# Patient Record
Sex: MALE | Race: WHITE | NOT HISPANIC OR LATINO
[De-identification: names, ages, dates, MRNs, and addresses within clinical notes are randomized per-mention and may not be internally consistent; named-entity substitution may affect disease eponyms.]

---

## 2017-02-28 ENCOUNTER — MEDICATION RENEWAL (OUTPATIENT)
Age: 62
End: 2017-02-28

## 2017-03-08 ENCOUNTER — MEDICATION RENEWAL (OUTPATIENT)
Age: 62
End: 2017-03-08

## 2017-04-18 ENCOUNTER — RX RENEWAL (OUTPATIENT)
Age: 62
End: 2017-04-18

## 2017-05-03 ENCOUNTER — LABORATORY RESULT (OUTPATIENT)
Age: 62
End: 2017-05-03

## 2017-05-11 ENCOUNTER — APPOINTMENT (OUTPATIENT)
Dept: NEPHROLOGY | Facility: CLINIC | Age: 62
End: 2017-05-11

## 2017-05-11 VITALS
HEART RATE: 76 BPM | DIASTOLIC BLOOD PRESSURE: 88 MMHG | HEIGHT: 70 IN | WEIGHT: 204 LBS | BODY MASS INDEX: 29.2 KG/M2 | SYSTOLIC BLOOD PRESSURE: 145 MMHG | OXYGEN SATURATION: 97 %

## 2017-05-11 VITALS — DIASTOLIC BLOOD PRESSURE: 82 MMHG | SYSTOLIC BLOOD PRESSURE: 144 MMHG

## 2017-08-30 ENCOUNTER — LABORATORY RESULT (OUTPATIENT)
Age: 62
End: 2017-08-30

## 2017-09-05 LAB
25(OH)D3 SERPL-MCNC: 31.4 NG/ML
ALBUMIN SERPL ELPH-MCNC: 4.5 G/DL
ALP BLD-CCNC: 79 U/L
ALT SERPL-CCNC: 18 U/L
ANION GAP SERPL CALC-SCNC: 15 MMOL/L
APPEARANCE: CLEAR
AST SERPL-CCNC: 18 U/L
BACTERIA: NEGATIVE
BASOPHILS # BLD AUTO: 0.01 K/UL
BASOPHILS NFR BLD AUTO: 0.2 %
BILIRUB SERPL-MCNC: 0.6 MG/DL
BILIRUBIN URINE: NEGATIVE
BLOOD URINE: NEGATIVE
BUN SERPL-MCNC: 20 MG/DL
CALCIUM SERPL-MCNC: 9.3 MG/DL
CHLORIDE SERPL-SCNC: 99 MMOL/L
CHOLEST SERPL-MCNC: 153 MG/DL
CHOLEST/HDLC SERPL: 4.4 RATIO
CO2 SERPL-SCNC: 25 MMOL/L
COLOR: YELLOW
CREAT SERPL-MCNC: 1.54 MG/DL
CREAT SPEC-SCNC: 25 MG/DL
CREAT SPEC-SCNC: 25 MG/DL
CREAT/PROT UR: NORMAL
EOSINOPHIL # BLD AUTO: 0.15 K/UL
EOSINOPHIL NFR BLD AUTO: 2.5 %
GLUCOSE QUALITATIVE U: NORMAL MG/DL
GLUCOSE SERPL-MCNC: 113 MG/DL
HBA1C MFR BLD HPLC: 5.8 %
HCT VFR BLD CALC: 47.1 %
HDLC SERPL-MCNC: 35 MG/DL
HGB BLD-MCNC: 15.6 G/DL
IGA 24H UR QL IFE: NORMAL
IMM GRANULOCYTES NFR BLD AUTO: 0.2 %
KETONES URINE: NEGATIVE
LDLC SERPL CALC-MCNC: 82 MG/DL
LEUKOCYTE ESTERASE URINE: NEGATIVE
LYMPHOCYTES # BLD AUTO: 1.37 K/UL
LYMPHOCYTES NFR BLD AUTO: 22.6 %
MAGNESIUM SERPL-MCNC: 1.9 MG/DL
MAN DIFF?: NORMAL
MCHC RBC-ENTMCNC: 27.9 PG
MCHC RBC-ENTMCNC: 33.1 GM/DL
MCV RBC AUTO: 84.1 FL
MICROALBUMIN 24H UR DL<=1MG/L-MCNC: <0.3 MG/DL
MICROALBUMIN/CREAT 24H UR-RTO: 54 MG/G
MICROSCOPIC-UA: NORMAL
MONOCYTES # BLD AUTO: 0.57 K/UL
MONOCYTES NFR BLD AUTO: 9.4 %
NEUTROPHILS # BLD AUTO: 3.96 K/UL
NEUTROPHILS NFR BLD AUTO: 65.1 %
NITRITE URINE: NEGATIVE
PH URINE: 6.5
PHOSPHATE SERPL-MCNC: 2.5 MG/DL
PLATELET # BLD AUTO: 197 K/UL
POTASSIUM SERPL-SCNC: 4.3 MMOL/L
PROT SERPL-MCNC: 7.3 G/DL
PROT UR-MCNC: <4 MG/DL
PROTEIN URINE: NEGATIVE MG/DL
RBC # BLD: 5.6 M/UL
RBC # FLD: 14 %
RED BLOOD CELLS URINE: 0 /HPF
SODIUM SERPL-SCNC: 139 MMOL/L
SPECIFIC GRAVITY URINE: 1.01
SQUAMOUS EPITHELIAL CELLS: 0 /HPF
TACROLIMUS SERPL-MCNC: 5.6 NG/ML
TRIGL SERPL-MCNC: 179 MG/DL
TSH SERPL-ACNC: 1.58 UIU/ML
URATE SERPL-MCNC: 5 MG/DL
UROBILINOGEN URINE: NORMAL MG/DL
WBC # FLD AUTO: 6.07 K/UL
WHITE BLOOD CELLS URINE: 2 /HPF

## 2017-09-06 ENCOUNTER — APPOINTMENT (OUTPATIENT)
Dept: NEPHROLOGY | Facility: CLINIC | Age: 62
End: 2017-09-06
Payer: MEDICARE

## 2017-09-06 VITALS
DIASTOLIC BLOOD PRESSURE: 77 MMHG | SYSTOLIC BLOOD PRESSURE: 121 MMHG | OXYGEN SATURATION: 98 % | WEIGHT: 203.92 LBS | HEIGHT: 70 IN | HEART RATE: 75 BPM | BODY MASS INDEX: 29.19 KG/M2

## 2017-09-06 DIAGNOSIS — R73.03 PREDIABETES.: ICD-10-CM

## 2017-09-06 PROCEDURE — 99214 OFFICE O/P EST MOD 30 MIN: CPT

## 2017-12-27 ENCOUNTER — MEDICATION RENEWAL (OUTPATIENT)
Age: 62
End: 2017-12-27

## 2018-01-04 LAB
25(OH)D3 SERPL-MCNC: 24.1 NG/ML
ALBUMIN SERPL ELPH-MCNC: 4.4 G/DL
ALP BLD-CCNC: 77 U/L
ALT SERPL-CCNC: 25 U/L
ANION GAP SERPL CALC-SCNC: 13 MMOL/L
APPEARANCE: CLEAR
AST SERPL-CCNC: 26 U/L
BACTERIA: NEGATIVE
BASOPHILS # BLD AUTO: 0.02 K/UL
BASOPHILS NFR BLD AUTO: 0.3 %
BILIRUB SERPL-MCNC: 0.7 MG/DL
BILIRUBIN URINE: NEGATIVE
BLOOD URINE: NEGATIVE
BUN SERPL-MCNC: 16 MG/DL
CALCIUM SERPL-MCNC: 9.5 MG/DL
CHLORIDE SERPL-SCNC: 100 MMOL/L
CHOLEST SERPL-MCNC: 152 MG/DL
CHOLEST/HDLC SERPL: 4.2 RATIO
CO2 SERPL-SCNC: 25 MMOL/L
COLOR: YELLOW
CREAT SERPL-MCNC: 1.48 MG/DL
CREAT SPEC-SCNC: 170 MG/DL
CREAT SPEC-SCNC: 170 MG/DL
CREAT/PROT UR: 0.1 RATIO
EOSINOPHIL # BLD AUTO: 0.29 K/UL
EOSINOPHIL NFR BLD AUTO: 4.1 %
GLUCOSE QUALITATIVE U: NEGATIVE MG/DL
GLUCOSE SERPL-MCNC: 111 MG/DL
HBA1C MFR BLD HPLC: 5.8 %
HCT VFR BLD CALC: 48 %
HDLC SERPL-MCNC: 36 MG/DL
HGB BLD-MCNC: 16.2 G/DL
HYALINE CASTS: 1 /LPF
IMM GRANULOCYTES NFR BLD AUTO: 0 %
KETONES URINE: NEGATIVE
LDLC SERPL CALC-MCNC: 86 MG/DL
LEUKOCYTE ESTERASE URINE: ABNORMAL
LYMPHOCYTES # BLD AUTO: 1.54 K/UL
LYMPHOCYTES NFR BLD AUTO: 21.7 %
MAGNESIUM SERPL-MCNC: 1.7 MG/DL
MAN DIFF?: NORMAL
MCHC RBC-ENTMCNC: 28.4 PG
MCHC RBC-ENTMCNC: 33.8 GM/DL
MCV RBC AUTO: 84.1 FL
MICROALBUMIN 24H UR DL<=1MG/L-MCNC: 1.2 MG/DL
MICROALBUMIN/CREAT 24H UR-RTO: 7 MG/G
MICROSCOPIC-UA: NORMAL
MONOCYTES # BLD AUTO: 0.57 K/UL
MONOCYTES NFR BLD AUTO: 8 %
NEUTROPHILS # BLD AUTO: 4.69 K/UL
NEUTROPHILS NFR BLD AUTO: 65.9 %
NITRITE URINE: NEGATIVE
PH URINE: 5
PHOSPHATE SERPL-MCNC: 2.4 MG/DL
PLATELET # BLD AUTO: 186 K/UL
POTASSIUM SERPL-SCNC: 4.4 MMOL/L
PROT SERPL-MCNC: 7.3 G/DL
PROT UR-MCNC: 11 MG/DL
PROTEIN URINE: NEGATIVE MG/DL
RBC # BLD: 5.71 M/UL
RBC # FLD: 14.2 %
RED BLOOD CELLS URINE: 2 /HPF
SODIUM SERPL-SCNC: 138 MMOL/L
SPECIFIC GRAVITY URINE: 1.02
SQUAMOUS EPITHELIAL CELLS: 1 /HPF
TACROLIMUS SERPL-MCNC: 7 NG/ML
TRIGL SERPL-MCNC: 148 MG/DL
URATE SERPL-MCNC: 5.5 MG/DL
UROBILINOGEN URINE: NEGATIVE MG/DL
WBC # FLD AUTO: 7.11 K/UL
WHITE BLOOD CELLS URINE: 8 /HPF

## 2018-01-10 ENCOUNTER — APPOINTMENT (OUTPATIENT)
Dept: NEPHROLOGY | Facility: CLINIC | Age: 63
End: 2018-01-10
Payer: MEDICARE

## 2018-01-10 VITALS
WEIGHT: 207.67 LBS | SYSTOLIC BLOOD PRESSURE: 130 MMHG | HEART RATE: 75 BPM | HEIGHT: 70 IN | DIASTOLIC BLOOD PRESSURE: 84 MMHG | OXYGEN SATURATION: 97 % | BODY MASS INDEX: 29.73 KG/M2

## 2018-01-10 VITALS — SYSTOLIC BLOOD PRESSURE: 132 MMHG | DIASTOLIC BLOOD PRESSURE: 80 MMHG

## 2018-01-10 PROCEDURE — 99214 OFFICE O/P EST MOD 30 MIN: CPT

## 2018-02-05 ENCOUNTER — MEDICATION RENEWAL (OUTPATIENT)
Age: 63
End: 2018-02-05

## 2018-03-26 LAB
25(OH)D3 SERPL-MCNC: 23.4 NG/ML
ALBUMIN SERPL ELPH-MCNC: 4 G/DL
ANION GAP SERPL CALC-SCNC: 14 MMOL/L
APPEARANCE: CLEAR
BACTERIA: NEGATIVE
BASOPHILS # BLD AUTO: 0.02 K/UL
BASOPHILS NFR BLD AUTO: 0.3 %
BILIRUBIN URINE: NEGATIVE
BLOOD URINE: NEGATIVE
BUN SERPL-MCNC: 23 MG/DL
CALCIUM SERPL-MCNC: 9.1 MG/DL
CHLORIDE SERPL-SCNC: 101 MMOL/L
CHOLEST SERPL-MCNC: 140 MG/DL
CHOLEST/HDLC SERPL: 5 RATIO
CO2 SERPL-SCNC: 23 MMOL/L
COLOR: YELLOW
CREAT SERPL-MCNC: 1.63 MG/DL
CREAT SPEC-SCNC: 88 MG/DL
CREAT SPEC-SCNC: 88 MG/DL
CREAT/PROT UR: 0.1 RATIO
EOSINOPHIL # BLD AUTO: 0.11 K/UL
EOSINOPHIL NFR BLD AUTO: 1.9 %
GLUCOSE QUALITATIVE U: NEGATIVE MG/DL
GLUCOSE SERPL-MCNC: 100 MG/DL
HBA1C MFR BLD HPLC: 5.9 %
HCT VFR BLD CALC: 45.2 %
HDLC SERPL-MCNC: 28 MG/DL
HGB BLD-MCNC: 15.4 G/DL
IMM GRANULOCYTES NFR BLD AUTO: 0.2 %
KETONES URINE: NEGATIVE
LDLC SERPL CALC-MCNC: 77 MG/DL
LEUKOCYTE ESTERASE URINE: NEGATIVE
LYMPHOCYTES # BLD AUTO: 1.3 K/UL
LYMPHOCYTES NFR BLD AUTO: 22.1 %
MAGNESIUM SERPL-MCNC: 1.8 MG/DL
MAN DIFF?: NORMAL
MCHC RBC-ENTMCNC: 28.3 PG
MCHC RBC-ENTMCNC: 34.1 GM/DL
MCV RBC AUTO: 82.9 FL
MICROALBUMIN 24H UR DL<=1MG/L-MCNC: 0.5 MG/DL
MICROALBUMIN/CREAT 24H UR-RTO: 6 MG/G
MICROSCOPIC-UA: NORMAL
MONOCYTES # BLD AUTO: 0.48 K/UL
MONOCYTES NFR BLD AUTO: 8.1 %
NEUTROPHILS # BLD AUTO: 3.97 K/UL
NEUTROPHILS NFR BLD AUTO: 67.4 %
NITRITE URINE: NEGATIVE
PH URINE: 6
PHOSPHATE SERPL-MCNC: 3 MG/DL
PLATELET # BLD AUTO: 178 K/UL
POTASSIUM SERPL-SCNC: 4.4 MMOL/L
PROT UR-MCNC: 6 MG/DL
PROTEIN URINE: NEGATIVE MG/DL
RBC # BLD: 5.45 M/UL
RBC # FLD: 14.2 %
RED BLOOD CELLS URINE: 1 /HPF
SODIUM SERPL-SCNC: 138 MMOL/L
SPECIFIC GRAVITY URINE: 1.01
SQUAMOUS EPITHELIAL CELLS: 0 /HPF
TACROLIMUS SERPL-MCNC: 7.4 NG/ML
TRIGL SERPL-MCNC: 175 MG/DL
TSH SERPL-ACNC: 1.93 UIU/ML
URATE SERPL-MCNC: 5.3 MG/DL
UROBILINOGEN URINE: NEGATIVE MG/DL
WBC # FLD AUTO: 5.89 K/UL
WHITE BLOOD CELLS URINE: 1 /HPF

## 2018-03-28 ENCOUNTER — APPOINTMENT (OUTPATIENT)
Dept: NEPHROLOGY | Facility: CLINIC | Age: 63
End: 2018-03-28
Payer: MEDICARE

## 2018-03-28 VITALS
WEIGHT: 204 LBS | DIASTOLIC BLOOD PRESSURE: 95 MMHG | HEIGHT: 70 IN | OXYGEN SATURATION: 98 % | SYSTOLIC BLOOD PRESSURE: 145 MMHG | HEART RATE: 73 BPM | BODY MASS INDEX: 29.2 KG/M2

## 2018-03-28 VITALS — DIASTOLIC BLOOD PRESSURE: 86 MMHG | SYSTOLIC BLOOD PRESSURE: 136 MMHG

## 2018-03-28 DIAGNOSIS — I12.9 HYPERTENSIVE CHRONIC KIDNEY DISEASE WITH STAGE 1 THROUGH STAGE 4 CHRONIC KIDNEY DISEASE, OR UNSPECIFIED CHRONIC KIDNEY DISEASE: ICD-10-CM

## 2018-03-28 DIAGNOSIS — Z92.29 PERSONAL HISTORY OF OTHER DRUG THERAPY: ICD-10-CM

## 2018-03-28 PROCEDURE — 99214 OFFICE O/P EST MOD 30 MIN: CPT

## 2018-04-04 ENCOUNTER — RX RENEWAL (OUTPATIENT)
Age: 63
End: 2018-04-04

## 2018-05-08 LAB
ALBUMIN SERPL ELPH-MCNC: 4.5 G/DL
ANION GAP SERPL CALC-SCNC: 14 MMOL/L
APPEARANCE: CLEAR
BACTERIA: NEGATIVE
BILIRUBIN URINE: NEGATIVE
BLOOD URINE: NEGATIVE
BUN SERPL-MCNC: 26 MG/DL
CALCIUM SERPL-MCNC: 9 MG/DL
CHLORIDE SERPL-SCNC: 100 MMOL/L
CO2 SERPL-SCNC: 24 MMOL/L
COLOR: YELLOW
CREAT SERPL-MCNC: 1.27 MG/DL
CREAT SPEC-SCNC: 31 MG/DL
CREAT/PROT UR: NORMAL
GLUCOSE QUALITATIVE U: NEGATIVE MG/DL
GLUCOSE SERPL-MCNC: 110 MG/DL
HYALINE CASTS: 0 /LPF
KETONES URINE: NEGATIVE
LEUKOCYTE ESTERASE URINE: NEGATIVE
MICROSCOPIC-UA: NORMAL
NITRITE URINE: NEGATIVE
PH URINE: 6
PHOSPHATE SERPL-MCNC: 2.4 MG/DL
POTASSIUM SERPL-SCNC: 3.9 MMOL/L
PROT UR-MCNC: <4 MG/DL
PROTEIN URINE: NEGATIVE MG/DL
RED BLOOD CELLS URINE: 0 /HPF
SODIUM SERPL-SCNC: 138 MMOL/L
SPECIFIC GRAVITY URINE: 1.01
SQUAMOUS EPITHELIAL CELLS: 0 /HPF
TACROLIMUS SERPL-MCNC: 5.7 NG/ML
UROBILINOGEN URINE: NEGATIVE MG/DL
WHITE BLOOD CELLS URINE: 3 /HPF

## 2018-05-16 ENCOUNTER — MEDICATION RENEWAL (OUTPATIENT)
Age: 63
End: 2018-05-16

## 2018-07-13 ENCOUNTER — APPOINTMENT (OUTPATIENT)
Dept: NEPHROLOGY | Facility: CLINIC | Age: 63
End: 2018-07-13
Payer: MEDICARE

## 2018-07-13 VITALS
BODY MASS INDEX: 28.72 KG/M2 | SYSTOLIC BLOOD PRESSURE: 125 MMHG | DIASTOLIC BLOOD PRESSURE: 92 MMHG | WEIGHT: 200.62 LBS | HEART RATE: 69 BPM | OXYGEN SATURATION: 98 % | HEIGHT: 70 IN

## 2018-07-13 PROCEDURE — 99213 OFFICE O/P EST LOW 20 MIN: CPT

## 2018-09-17 ENCOUNTER — RX RENEWAL (OUTPATIENT)
Age: 63
End: 2018-09-17

## 2018-09-17 LAB
25(OH)D3 SERPL-MCNC: 53.7 NG/ML
ALBUMIN SERPL ELPH-MCNC: 4.3 G/DL
ALP BLD-CCNC: 76 U/L
ALT SERPL-CCNC: 19 U/L
ANION GAP SERPL CALC-SCNC: 12 MMOL/L
APPEARANCE: CLEAR
AST SERPL-CCNC: 18 U/L
BASOPHILS # BLD AUTO: 0.02 K/UL
BASOPHILS NFR BLD AUTO: 0.3 %
BILIRUB SERPL-MCNC: 0.7 MG/DL
BILIRUBIN URINE: NEGATIVE
BKV DNA SPEC QL NAA+PROBE: NORMAL
BLOOD URINE: NEGATIVE
BUN SERPL-MCNC: 18 MG/DL
CALCIUM SERPL-MCNC: 9.3 MG/DL
CHLORIDE SERPL-SCNC: 99 MMOL/L
CHOLEST SERPL-MCNC: 135 MG/DL
CHOLEST/HDLC SERPL: 4 RATIO
CO2 SERPL-SCNC: 23 MMOL/L
COLOR: YELLOW
CREAT SERPL-MCNC: 1.29 MG/DL
CREAT SPEC-SCNC: 23 MG/DL
CREAT/PROT UR: NORMAL
EOSINOPHIL # BLD AUTO: 0.17 K/UL
EOSINOPHIL NFR BLD AUTO: 2.3 %
GLUCOSE QUALITATIVE U: NEGATIVE MG/DL
GLUCOSE SERPL-MCNC: 108 MG/DL
HCT VFR BLD CALC: 46.3 %
HDLC SERPL-MCNC: 34 MG/DL
HGB BLD-MCNC: 15.3 G/DL
IMM GRANULOCYTES NFR BLD AUTO: 0.3 %
KETONES URINE: NEGATIVE
LDLC SERPL CALC-MCNC: 79 MG/DL
LEUKOCYTE ESTERASE URINE: NEGATIVE
LYMPHOCYTES # BLD AUTO: 1.15 K/UL
LYMPHOCYTES NFR BLD AUTO: 15.4 %
MAN DIFF?: NORMAL
MCHC RBC-ENTMCNC: 27.6 PG
MCHC RBC-ENTMCNC: 33 GM/DL
MCV RBC AUTO: 83.4 FL
MONOCYTES # BLD AUTO: 0.68 K/UL
MONOCYTES NFR BLD AUTO: 9.1 %
NEUTROPHILS # BLD AUTO: 5.43 K/UL
NEUTROPHILS NFR BLD AUTO: 72.6 %
NITRITE URINE: NEGATIVE
PH URINE: 6.5
PLATELET # BLD AUTO: 200 K/UL
POTASSIUM SERPL-SCNC: 4 MMOL/L
PROT SERPL-MCNC: 7.2 G/DL
PROT UR-MCNC: <4 MG/DL
PROTEIN URINE: NEGATIVE MG/DL
RBC # BLD: 5.55 M/UL
RBC # FLD: 14 %
SODIUM SERPL-SCNC: 134 MMOL/L
SPECIFIC GRAVITY URINE: 1
TACROLIMUS SERPL-MCNC: 5.8 NG/ML
TRIGL SERPL-MCNC: 112 MG/DL
UROBILINOGEN URINE: NEGATIVE MG/DL
WBC # FLD AUTO: 7.47 K/UL

## 2018-09-20 ENCOUNTER — APPOINTMENT (OUTPATIENT)
Dept: NEPHROLOGY | Facility: CLINIC | Age: 63
End: 2018-09-20
Payer: MEDICARE

## 2018-09-20 VITALS
DIASTOLIC BLOOD PRESSURE: 103 MMHG | HEART RATE: 70 BPM | SYSTOLIC BLOOD PRESSURE: 157 MMHG | HEIGHT: 70 IN | BODY MASS INDEX: 28.63 KG/M2 | WEIGHT: 200 LBS | OXYGEN SATURATION: 97 %

## 2018-09-20 PROCEDURE — 99214 OFFICE O/P EST MOD 30 MIN: CPT

## 2019-01-22 ENCOUNTER — RX RENEWAL (OUTPATIENT)
Age: 64
End: 2019-01-22

## 2019-02-25 ENCOUNTER — MEDICATION RENEWAL (OUTPATIENT)
Age: 64
End: 2019-02-25

## 2019-02-26 ENCOUNTER — MEDICATION RENEWAL (OUTPATIENT)
Age: 64
End: 2019-02-26

## 2019-03-22 ENCOUNTER — APPOINTMENT (OUTPATIENT)
Dept: NEPHROLOGY | Facility: CLINIC | Age: 64
End: 2019-03-22
Payer: MEDICARE

## 2019-03-22 VITALS
BODY MASS INDEX: 29.35 KG/M2 | SYSTOLIC BLOOD PRESSURE: 146 MMHG | WEIGHT: 205.03 LBS | OXYGEN SATURATION: 97 % | HEART RATE: 69 BPM | HEIGHT: 70 IN | DIASTOLIC BLOOD PRESSURE: 89 MMHG

## 2019-03-22 VITALS — SYSTOLIC BLOOD PRESSURE: 132 MMHG | DIASTOLIC BLOOD PRESSURE: 84 MMHG

## 2019-03-22 LAB
25(OH)D3 SERPL-MCNC: 59.1 NG/ML
ALBUMIN SERPL ELPH-MCNC: 4.2 G/DL
ALP BLD-CCNC: 66 U/L
ALT SERPL-CCNC: 22 U/L
ANION GAP SERPL CALC-SCNC: 11 MMOL/L
APPEARANCE: CLEAR
AST SERPL-CCNC: 18 U/L
BASOPHILS # BLD AUTO: 0.03 K/UL
BASOPHILS NFR BLD AUTO: 0.5 %
BILIRUB SERPL-MCNC: 0.7 MG/DL
BILIRUBIN URINE: NEGATIVE
BLOOD URINE: NEGATIVE
BUN SERPL-MCNC: 18 MG/DL
CALCIUM SERPL-MCNC: 9.4 MG/DL
CHLORIDE SERPL-SCNC: 102 MMOL/L
CHOLEST SERPL-MCNC: 123 MG/DL
CHOLEST/HDLC SERPL: 3.8 RATIO
CO2 SERPL-SCNC: 25 MMOL/L
COLOR: COLORLESS
CREAT SERPL-MCNC: 1.33 MG/DL
CREAT SPEC-SCNC: 21 MG/DL
CREAT/PROT UR: NORMAL RATIO
EOSINOPHIL # BLD AUTO: 0.15 K/UL
EOSINOPHIL NFR BLD AUTO: 2.7 %
GLUCOSE QUALITATIVE U: NEGATIVE
GLUCOSE SERPL-MCNC: 101 MG/DL
HBA1C MFR BLD HPLC: 6 %
HCT VFR BLD CALC: 47.1 %
HDLC SERPL-MCNC: 32 MG/DL
HGB BLD-MCNC: 15.7 G/DL
IMM GRANULOCYTES NFR BLD AUTO: 0.2 %
KETONES URINE: NEGATIVE
LDLC SERPL CALC-MCNC: 72 MG/DL
LEUKOCYTE ESTERASE URINE: NEGATIVE
LYMPHOCYTES # BLD AUTO: 1.17 K/UL
LYMPHOCYTES NFR BLD AUTO: 21.2 %
MAGNESIUM SERPL-MCNC: 1.7 MG/DL
MAN DIFF?: NORMAL
MCHC RBC-ENTMCNC: 27.7 PG
MCHC RBC-ENTMCNC: 33.3 GM/DL
MCV RBC AUTO: 83.2 FL
MONOCYTES # BLD AUTO: 0.53 K/UL
MONOCYTES NFR BLD AUTO: 9.6 %
NEUTROPHILS # BLD AUTO: 3.63 K/UL
NEUTROPHILS NFR BLD AUTO: 65.8 %
NITRITE URINE: NEGATIVE
PH URINE: 6.5
PLATELET # BLD AUTO: 182 K/UL
POTASSIUM SERPL-SCNC: 4.2 MMOL/L
PROT SERPL-MCNC: 7 G/DL
PROT UR-MCNC: <4 MG/DL
PROTEIN URINE: NEGATIVE
RBC # BLD: 5.66 M/UL
RBC # FLD: 13.5 %
SODIUM SERPL-SCNC: 138 MMOL/L
SPECIFIC GRAVITY URINE: 1
TACROLIMUS SERPL-MCNC: 6.3 NG/ML
TRIGL SERPL-MCNC: 93 MG/DL
URATE SERPL-MCNC: 5.2 MG/DL
UROBILINOGEN URINE: NORMAL
WBC # FLD AUTO: 5.52 K/UL

## 2019-03-22 PROCEDURE — 99214 OFFICE O/P EST MOD 30 MIN: CPT

## 2019-03-22 NOTE — HISTORY OF PRESENT ILLNESS
[FreeTextEntry1] : Today I had the pleasure of seeing Eldon Bull who is a 63 years old man who had a living related renal transplant in october 2014 from him brother.  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  By the patient's report the course at [facility] was uncomplicated.  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  He eats an unbalanced diet with salt and sugar but he stay active and walks many miles per day.  On evaluation today he feels well, denies dyspnea, thrush, chest pain, cough, NVD.  Recently however he went to an urgent care and was treated for bronchitis with a course of augmentin.  \par \par 9/20/18 -- I saw Eldon today and he reports feeling well.  No issues with urination.  He has been staying well hydrated in the summer heat.  His BP has been elevated recently and he is eating a lot but also walking a lot.  Still working on a low salt diet.\par \par 3/22/19 --  I saw doron today and he feels really good.  Hasn't been exercising as much in the winter time.  walks still but less.  no cp no sob no nvd.

## 2019-03-22 NOTE — REVIEW OF SYSTEMS
[Fever] : no fever [Chills] : no chills [Feeling Poorly] : not feeling poorly [Feeling Tired] : not feeling tired [Eyesight Problems] : no eyesight problems [Discharge From Eyes] : no purulent discharge from the eyes [Nosebleeds] : no nosebleeds [Nasal Discharge] : no nasal discharge [Chest Pain] : no chest pain [Palpitations] : no palpitations [Leg Claudication] : no intermittent leg claudication [Lower Ext Edema] : no extremity edema [Shortness Of Breath] : no shortness of breath [Wheezing] : no wheezing [Cough] : no cough [SOB on Exertion] : no shortness of breath during exertion [Abdominal Pain] : no abdominal pain [Vomiting] : no vomiting [Constipation] : no constipation [Diarrhea] : no diarrhea [Hesitancy] : no urinary hesitancy [Nocturia] : no nocturia [Arthralgias] : no arthralgias [Joint Pain] : no joint pain [Confused] : no confusion [Convulsions] : no convulsions [Fainting] : no fainting [Anxiety] : no anxiety [Depression] : no depression [Easy Bleeding] : no tendency for easy bleeding [Easy Bruising] : no tendency for easy bruising

## 2019-03-22 NOTE — PHYSICAL EXAM
[General Appearance - Alert] : alert [General Appearance - In No Acute Distress] : in no acute distress [General Appearance - Well Nourished] : well nourished [General Appearance - Well Developed] : well developed [Sclera] : the sclera and conjunctiva were normal [FreeTextEntry1] : no thrush noted [Neck Appearance] : the appearance of the neck was normal [Neck Cervical Mass (___cm)] : no neck mass was observed [Jugular Venous Distention Increased] : there was no jugular-venous distention [Thyroid Diffuse Enlargement] : the thyroid was not enlarged [Respiration, Rhythm And Depth] : normal respiratory rhythm and effort [Exaggerated Use Of Accessory Muscles For Inspiration] : no accessory muscle use [Auscultation Breath Sounds / Voice Sounds] : lungs were clear to auscultation bilaterally [Heart Sounds] : normal S1 and S2 [Heart Sounds Gallop] : no gallops [Murmurs] : no murmurs [Heart Sounds Pericardial Friction Rub] : no pericardial rub [Edema] : there was no peripheral edema [Abdomen Soft] : soft [Abdomen Tenderness] : non-tender [] : no hepato-splenomegaly [Abdomen Mass (___ Cm)] : no abdominal mass palpated [Cervical Lymph Nodes Enlarged Posterior Bilaterally] : posterior cervical [Cervical Lymph Nodes Enlarged Anterior Bilaterally] : anterior cervical [Supraclavicular Lymph Nodes Enlarged Bilaterally] : supraclavicular [Axillary Lymph Nodes Enlarged Bilaterally] : axillary [No CVA Tenderness] : no ~M costovertebral angle tenderness [No Spinal Tenderness] : no spinal tenderness [Abnormal Walk] : normal gait [Oriented To Time, Place, And Person] : oriented to person, place, and time [Impaired Insight] : insight and judgment were intact [Affect] : the affect was normal [Mood] : the mood was normal

## 2019-03-22 NOTE — ASSESSMENT
[FreeTextEntry1] : 64 years old man with PCKD s/p LRRT in 2014 doing well\par \par Kidney Replaced by Transplant -- The patient's creatinine has been  stable last reading at 1.3.  He gets blood work every few months and will come for a tacro level and repeat blood work ain six months and follow up with a visit with me thereafter.  I stressed to the patient the importance of notifying me any time new medications are prescribed so that we can check them for interactions.  We reviewed the importance of annual dermatology examinations as well as avoiding sick contacts, frequent hand washing, and he is up to date with vaccinations.\par \par Hypertension -- In the setting of  renal transplant on tacrolimus.  The patient's blood pressure is optimized.   Continue with present meds.  \par \par Health Care Maintenance -- Given his long term immunosuppressed status I stressed with him the importance of following up with dermatology annually.  I told him that he should get a flu shot and establish care with an internist.  number provided.

## 2019-09-11 LAB
ALBUMIN SERPL ELPH-MCNC: 4.6 G/DL
ALP BLD-CCNC: 77 U/L
ALT SERPL-CCNC: 21 U/L
ANION GAP SERPL CALC-SCNC: 13 MMOL/L
APPEARANCE: CLEAR
AST SERPL-CCNC: 19 U/L
BASOPHILS # BLD AUTO: 0.02 K/UL
BASOPHILS NFR BLD AUTO: 0.3 %
BILIRUB SERPL-MCNC: 0.5 MG/DL
BILIRUBIN URINE: NEGATIVE
BLOOD URINE: NEGATIVE
BUN SERPL-MCNC: 17 MG/DL
CALCIUM SERPL-MCNC: 9.3 MG/DL
CHLORIDE SERPL-SCNC: 102 MMOL/L
CHOLEST SERPL-MCNC: 132 MG/DL
CHOLEST/HDLC SERPL: 3.9 RATIO
CO2 SERPL-SCNC: 22 MMOL/L
COLOR: COLORLESS
CREAT SERPL-MCNC: 1.27 MG/DL
CREAT SPEC-SCNC: 10 MG/DL
CREAT/PROT UR: NORMAL RATIO
EOSINOPHIL # BLD AUTO: 0.2 K/UL
EOSINOPHIL NFR BLD AUTO: 3.1 %
ESTIMATED AVERAGE GLUCOSE: 128 MG/DL
GLUCOSE QUALITATIVE U: NEGATIVE
GLUCOSE SERPL-MCNC: 115 MG/DL
HBA1C MFR BLD HPLC: 6.1 %
HCT VFR BLD CALC: 48.3 %
HDLC SERPL-MCNC: 34 MG/DL
HGB BLD-MCNC: 15.9 G/DL
IMM GRANULOCYTES NFR BLD AUTO: 0.2 %
KETONES URINE: NEGATIVE
LDLC SERPL CALC-MCNC: 71 MG/DL
LEUKOCYTE ESTERASE URINE: NEGATIVE
LYMPHOCYTES # BLD AUTO: 1.3 K/UL
LYMPHOCYTES NFR BLD AUTO: 20.4 %
MAGNESIUM SERPL-MCNC: 1.8 MG/DL
MAN DIFF?: NORMAL
MCHC RBC-ENTMCNC: 28 PG
MCHC RBC-ENTMCNC: 32.9 GM/DL
MCV RBC AUTO: 85 FL
MONOCYTES # BLD AUTO: 0.63 K/UL
MONOCYTES NFR BLD AUTO: 9.9 %
NEUTROPHILS # BLD AUTO: 4.2 K/UL
NEUTROPHILS NFR BLD AUTO: 66.1 %
NITRITE URINE: NEGATIVE
PH URINE: 6.5
PLATELET # BLD AUTO: 151 K/UL
POTASSIUM SERPL-SCNC: 4 MMOL/L
PROT SERPL-MCNC: 7.2 G/DL
PROT UR-MCNC: <4 MG/DL
PROTEIN URINE: NEGATIVE
RBC # BLD: 5.68 M/UL
RBC # FLD: 13.9 %
SODIUM SERPL-SCNC: 137 MMOL/L
SPECIFIC GRAVITY URINE: 1
TACROLIMUS SERPL-MCNC: 6.4 NG/ML
TRIGL SERPL-MCNC: 133 MG/DL
URATE SERPL-MCNC: 5.3 MG/DL
UROBILINOGEN URINE: NORMAL
WBC # FLD AUTO: 6.36 K/UL

## 2019-09-12 LAB — 25(OH)D3 SERPL-MCNC: 45.8 NG/ML

## 2019-09-20 ENCOUNTER — APPOINTMENT (OUTPATIENT)
Dept: NEPHROLOGY | Facility: CLINIC | Age: 64
End: 2019-09-20
Payer: MEDICARE

## 2019-09-20 VITALS
HEART RATE: 68 BPM | OXYGEN SATURATION: 97 % | SYSTOLIC BLOOD PRESSURE: 140 MMHG | HEIGHT: 70 IN | WEIGHT: 194 LBS | DIASTOLIC BLOOD PRESSURE: 82 MMHG | BODY MASS INDEX: 27.77 KG/M2

## 2019-09-20 PROCEDURE — 99214 OFFICE O/P EST MOD 30 MIN: CPT

## 2019-09-20 NOTE — HISTORY OF PRESENT ILLNESS
[FreeTextEntry1] : Today I had the pleasure of seeing Eldon Bull who is a 63 years old man who had a living related renal transplant in october 2014 from him brother.  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  By the patient's report the course at [facility] was uncomplicated.  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  He eats an unbalanced diet with salt and sugar but he stay active and walks many miles per day.  On evaluation today he feels well, denies dyspnea, thrush, chest pain, cough, NVD.  Recently however he went to an urgent care and was treated for bronchitis with a course of augmentin.  \par \par 9/20/18 -- I saw Eldon today and he reports feeling well.  No issues with urination.  He has been staying well hydrated in the summer heat.  His BP has been elevated recently and he is eating a lot but also walking a lot.  Still working on a low salt diet.\par \par 3/22/19 --  I saw doron today and he feels really good.  Hasn't been exercising as much in the winter time.  walks still but less.  no cp no sob no nvd.\par \par 9/20/19 -- I saw doron today and he feels well overall.  he has not been watching his salt and he forgot to take his bp meds today.  he has been having a mild dull ache on his left flank at the surface of his skin associated with a number of lumps.  no cardiac symptoms.

## 2019-09-20 NOTE — PHYSICAL EXAM
[General Appearance - Well Nourished] : well nourished [General Appearance - In No Acute Distress] : in no acute distress [General Appearance - Alert] : alert [General Appearance - Well Developed] : well developed [Sclera] : the sclera and conjunctiva were normal [Neck Appearance] : the appearance of the neck was normal [Neck Cervical Mass (___cm)] : no neck mass was observed [Jugular Venous Distention Increased] : there was no jugular-venous distention [Thyroid Diffuse Enlargement] : the thyroid was not enlarged [Respiration, Rhythm And Depth] : normal respiratory rhythm and effort [Exaggerated Use Of Accessory Muscles For Inspiration] : no accessory muscle use [Auscultation Breath Sounds / Voice Sounds] : lungs were clear to auscultation bilaterally [Heart Sounds] : normal S1 and S2 [Heart Sounds Gallop] : no gallops [Murmurs] : no murmurs [Heart Sounds Pericardial Friction Rub] : no pericardial rub [Abdomen Soft] : soft [Abdomen Tenderness] : non-tender [] : no hepato-splenomegaly [Abdomen Mass (___ Cm)] : no abdominal mass palpated [Cervical Lymph Nodes Enlarged Posterior Bilaterally] : posterior cervical [Cervical Lymph Nodes Enlarged Anterior Bilaterally] : anterior cervical [Supraclavicular Lymph Nodes Enlarged Bilaterally] : supraclavicular [Axillary Lymph Nodes Enlarged Bilaterally] : axillary [No CVA Tenderness] : no ~M costovertebral angle tenderness [No Spinal Tenderness] : no spinal tenderness [Abnormal Walk] : normal gait [FreeTextEntry1] : on his left back and flank there are a number of subcutaneous rubbery mobile nodules likely representing lipoma [Oriented To Time, Place, And Person] : oriented to person, place, and time [Impaired Insight] : insight and judgment were intact [Affect] : the affect was normal [Mood] : the mood was normal

## 2019-09-20 NOTE — ASSESSMENT
[FreeTextEntry1] : 64 years old man with PCKD s/p LRRT in 2014 doing well\par \par Kidney Replaced by Transplant -- The patient's creatinine has been  stable.  He gets blood work every few months and will come for a tacro level and repeat blood work ain six months and follow up with a visit with me thereafter.  I stressed to the patient the importance of notifying me any time new medications are prescribed so that we can check them for interactions.  We reviewed the importance of annual dermatology examinations as well as avoiding sick contacts, frequent hand washing, and he is up to date with vaccinations. \par \par PKD -- The patient's left flank pain does not seem to be related to his native kidney but this could represent a ruptured cyst or even a stone.  I did not identify any obvious pathology on my POCUS screening exam.  Ordered formal renal sonogram.  I think the pain is coming from some lipomas under his skin.  he will see dermatology.\par \par Hypertension -- In the setting of  renal transplant on tacrolimus.  His diet is not low in salt.  stressed compliance with diet and medications.  a little edema noted today.\par \par Health Care Maintenance -- Given his long term immunosuppressed status I stressed with him the importance of following up with dermatology annually.  I told him that he should get a flu shot and establish care with an internist.  number provided.

## 2019-09-20 NOTE — REVIEW OF SYSTEMS
[Fever] : no fever [Chills] : no chills [Feeling Poorly] : not feeling poorly [Feeling Tired] : not feeling tired [Eyesight Problems] : no eyesight problems [Nosebleeds] : no nosebleeds [Chest Pain] : no chest pain [Palpitations] : no palpitations [Leg Claudication] : no intermittent leg claudication [Lower Ext Edema] : no extremity edema [Shortness Of Breath] : no shortness of breath [Wheezing] : no wheezing [SOB on Exertion] : no shortness of breath during exertion [Cough] : no cough [Abdominal Pain] : no abdominal pain [Vomiting] : no vomiting [As Noted in HPI] : as noted in HPI [Dizziness] : no dizziness [Arthralgias] : no arthralgias [Fainting] : no fainting [Anxiety] : no anxiety [Depression] : no depression [Easy Bleeding] : no tendency for easy bleeding

## 2019-10-01 ENCOUNTER — OUTPATIENT (OUTPATIENT)
Dept: OUTPATIENT SERVICES | Facility: HOSPITAL | Age: 64
LOS: 1 days | End: 2019-10-01
Payer: MEDICARE

## 2019-10-01 ENCOUNTER — APPOINTMENT (OUTPATIENT)
Dept: ULTRASOUND IMAGING | Facility: IMAGING CENTER | Age: 64
End: 2019-10-01
Payer: MEDICARE

## 2019-10-01 DIAGNOSIS — N18.4 CHRONIC KIDNEY DISEASE, STAGE 4 (SEVERE): ICD-10-CM

## 2019-10-01 PROCEDURE — 76770 US EXAM ABDO BACK WALL COMP: CPT | Mod: 26

## 2019-10-01 PROCEDURE — 76770 US EXAM ABDO BACK WALL COMP: CPT

## 2019-11-04 ENCOUNTER — APPOINTMENT (OUTPATIENT)
Dept: ULTRASOUND IMAGING | Facility: IMAGING CENTER | Age: 64
End: 2019-11-04
Payer: MEDICARE

## 2019-11-04 ENCOUNTER — OUTPATIENT (OUTPATIENT)
Dept: OUTPATIENT SERVICES | Facility: HOSPITAL | Age: 64
LOS: 1 days | End: 2019-11-04
Payer: MEDICARE

## 2019-11-04 DIAGNOSIS — D17.1 BENIGN LIPOMATOUS NEOPLASM OF SKIN AND SUBCUTANEOUS TISSUE OF TRUNK: ICD-10-CM

## 2019-11-04 PROCEDURE — 76882 US LMTD JT/FCL EVL NVASC XTR: CPT

## 2019-11-04 PROCEDURE — 76882 US LMTD JT/FCL EVL NVASC XTR: CPT | Mod: 26,RT

## 2019-11-19 ENCOUNTER — APPOINTMENT (OUTPATIENT)
Dept: SURGICAL ONCOLOGY | Facility: CLINIC | Age: 64
End: 2019-11-19
Payer: MEDICARE

## 2019-11-19 VITALS
HEIGHT: 70 IN | SYSTOLIC BLOOD PRESSURE: 143 MMHG | OXYGEN SATURATION: 96 % | HEART RATE: 70 BPM | DIASTOLIC BLOOD PRESSURE: 87 MMHG | WEIGHT: 190 LBS | BODY MASS INDEX: 27.2 KG/M2

## 2019-11-19 PROCEDURE — 99204 OFFICE O/P NEW MOD 45 MIN: CPT

## 2019-11-19 NOTE — CONSULT LETTER
[Dear  ___] : Dear  [unfilled], [Consult Letter:] : I had the pleasure of evaluating your patient, [unfilled]. [Please see my note below.] : Please see my note below. [Consult Closing:] : Thank you very much for allowing me to participate in the care of this patient.  If you have any questions, please do not hesitate to contact me. [Sincerely,] : Sincerely, [FreeTextEntry3] : Myke Mora MD, MPH, FACS\par Surgical Oncology\par [facility] [place]\par Associate Professor of Surgery\par Department of General Surgery\par [employer] [facility]  [DrLucio  ___] : Dr. DANIELLE

## 2019-11-19 NOTE — ASSESSMENT
[FreeTextEntry1] : 64 year old man with more prominent left flank soft tissue masses x 2, likely lipomas\par \par Plan:\par 1) Resection of left flank soft tissue masses x 2.  We discussed the risks, benefits, and alternatives of the procedure with the patient, he expressed understanding and agree to proceed.\par 2) Medical clearance\par 3) Continue immunosuppressive medications

## 2019-11-19 NOTE — HISTORY OF PRESENT ILLNESS
[de-identified] : 64 year old man s/p renal transplant in 10/2014 from brother\par He is currently on immunosuppressive medication (Allopurinol, Mycophenolate Mofetil, Tacrolimus)\par He has several soft tissue masses on his torso, which have been stable in size for years.\par Lately he has noticed that two of the masses on his left flank are more prominent and causing him signficant discomfort with occasional sharp pain.\par Denies fevers or chills.\par No weight loss.

## 2019-11-19 NOTE — REASON FOR VISIT
[Initial Consultation] : an initial consultation for [FreeTextEntry2] : left flank soft tissue masses

## 2019-11-19 NOTE — PHYSICAL EXAM
[Normal] : supple, no neck mass and thyroid not enlarged [Normal Neck Lymph Nodes] : normal neck lymph nodes  [Normal Supraclavicular Lymph Nodes] : normal supraclavicular lymph nodes [Normal Groin Lymph Nodes] : normal groin lymph nodes [Normal Axillary Lymph Nodes] : normal axillary lymph nodes [Normal] : oriented to person, place and time, with appropriate affect [de-identified] : two prominent soft tissue masses along left flank / lower ribs - mobile, non-tender, soft ; there are several other masses along left abdomen and chest, which do not cause discomfort

## 2019-12-12 ENCOUNTER — NON-APPOINTMENT (OUTPATIENT)
Age: 64
End: 2019-12-12

## 2019-12-12 ENCOUNTER — APPOINTMENT (OUTPATIENT)
Dept: INTERNAL MEDICINE | Facility: CLINIC | Age: 64
End: 2019-12-12
Payer: MEDICARE

## 2019-12-12 VITALS — SYSTOLIC BLOOD PRESSURE: 145 MMHG | DIASTOLIC BLOOD PRESSURE: 85 MMHG

## 2019-12-12 VITALS
SYSTOLIC BLOOD PRESSURE: 140 MMHG | HEIGHT: 70 IN | HEART RATE: 70 BPM | DIASTOLIC BLOOD PRESSURE: 90 MMHG | WEIGHT: 192 LBS | TEMPERATURE: 97.7 F | BODY MASS INDEX: 27.49 KG/M2 | OXYGEN SATURATION: 98 %

## 2019-12-12 DIAGNOSIS — Z00.00 ENCOUNTER FOR GENERAL ADULT MEDICAL EXAMINATION W/OUT ABNORMAL FINDINGS: ICD-10-CM

## 2019-12-12 DIAGNOSIS — Z01.818 ENCOUNTER FOR OTHER PREPROCEDURAL EXAMINATION: ICD-10-CM

## 2019-12-12 DIAGNOSIS — D17.9 BENIGN LIPOMATOUS NEOPLASM, UNSPECIFIED: ICD-10-CM

## 2019-12-12 DIAGNOSIS — Z94.0 KIDNEY TRANSPLANT STATUS: ICD-10-CM

## 2019-12-12 DIAGNOSIS — Z13.31 ENCOUNTER FOR SCREENING FOR DEPRESSION: ICD-10-CM

## 2019-12-12 PROCEDURE — G0439: CPT

## 2019-12-12 PROCEDURE — 99214 OFFICE O/P EST MOD 30 MIN: CPT | Mod: 25

## 2019-12-12 PROCEDURE — G0444 DEPRESSION SCREEN ANNUAL: CPT | Mod: 59

## 2019-12-12 PROCEDURE — 93000 ELECTROCARDIOGRAM COMPLETE: CPT

## 2019-12-12 PROCEDURE — 36415 COLL VENOUS BLD VENIPUNCTURE: CPT

## 2019-12-13 LAB
ALBUMIN SERPL ELPH-MCNC: 4.4 G/DL
ALP BLD-CCNC: 97 U/L
ALT SERPL-CCNC: 19 U/L
ANION GAP SERPL CALC-SCNC: 11 MMOL/L
AST SERPL-CCNC: 18 U/L
BASOPHILS # BLD AUTO: 0.03 K/UL
BASOPHILS NFR BLD AUTO: 0.4 %
BILIRUB SERPL-MCNC: 0.6 MG/DL
BUN SERPL-MCNC: 16 MG/DL
CALCIUM SERPL-MCNC: 9.3 MG/DL
CHLORIDE SERPL-SCNC: 107 MMOL/L
CO2 SERPL-SCNC: 22 MMOL/L
CREAT SERPL-MCNC: 1.25 MG/DL
EOSINOPHIL # BLD AUTO: 0.14 K/UL
EOSINOPHIL NFR BLD AUTO: 2 %
GLUCOSE SERPL-MCNC: 116 MG/DL
HCT VFR BLD CALC: 45.6 %
HGB BLD-MCNC: 14.9 G/DL
IMM GRANULOCYTES NFR BLD AUTO: 0.3 %
INR PPP: 1.03 RATIO
LYMPHOCYTES # BLD AUTO: 0.76 K/UL
LYMPHOCYTES NFR BLD AUTO: 11.1 %
MAN DIFF?: NORMAL
MCHC RBC-ENTMCNC: 28.1 PG
MCHC RBC-ENTMCNC: 32.7 GM/DL
MCV RBC AUTO: 85.9 FL
MONOCYTES # BLD AUTO: 0.51 K/UL
MONOCYTES NFR BLD AUTO: 7.5 %
NEUTROPHILS # BLD AUTO: 5.38 K/UL
NEUTROPHILS NFR BLD AUTO: 78.7 %
PLATELET # BLD AUTO: 138 K/UL
POTASSIUM SERPL-SCNC: 4.5 MMOL/L
PROT SERPL-MCNC: 6.8 G/DL
PT BLD: 11.7 SEC
RBC # BLD: 5.31 M/UL
RBC # FLD: 14.2 %
SODIUM SERPL-SCNC: 140 MMOL/L
WBC # FLD AUTO: 6.84 K/UL

## 2019-12-13 NOTE — PLAN
[FreeTextEntry1] : Preoperative exam prior to lipoma excision on 12/20 with Dr Mora\par -CBC, CMP, PT/INR ordered\par -EKG reviewed\par -medically optimized pending review of labs \par -PST to be scheduled\par \par S/p renal transplant\par -immunosuppressive medications as per renal\par -routine follow up with Dr Navas\par \par HTN\par -borderline on nifedipine\par -encourage diet control, home BP monitoring

## 2019-12-13 NOTE — HEALTH RISK ASSESSMENT
[Yes] : Yes [Never (0 pts)] : Never (0 points) [Monthly or less (1 pt)] : Monthly or less (1 point) [1 or 2 (0 pts)] : 1 or 2 (0 points) [0] : 1) Little interest or pleasure doing things: Not at all (0) [No] : In the past 12 months have you used drugs other than those required for medical reasons? No [Patient reported colonoscopy was normal] : Patient reported colonoscopy was normal [With Family] : lives with family [None] : None [Retired] : retired [] :  [Feels Safe at Home] : Feels safe at home [Fully functional (bathing, dressing, toileting, transferring, walking, feeding)] : Fully functional (bathing, dressing, toileting, transferring, walking, feeding) [# Of Children ___] : has [unfilled] children [Fully functional (using the telephone, shopping, preparing meals, housekeeping, doing laundry, using] : Fully functional and needs no help or supervision to perform IADLs (using the telephone, shopping, preparing meals, housekeeping, doing laundry, using transportation, managing medications and managing finances) [Smoke Detector] : smoke detector [Seat Belt] :  uses seat belt [Carbon Monoxide Detector] : carbon monoxide detector [] : No [Audit-CScore] : 1 [[id number]] : 0 [Reports changes in hearing] : Reports no changes in hearing [Change in mental status noted] : No change in mental status noted [Language] : denies difficulty with language [Reports changes in vision] : Reports no changes in vision [Reports changes in dental health] : Reports no changes in dental health [ColonoscopyDate] : 5 yrs ago [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

## 2019-12-13 NOTE — HISTORY OF PRESENT ILLNESS
[FreeTextEntry1] : establish care, medical clearance [de-identified] : 63yo M with PMH of ADPKD s/p renal transplant in 2014 on immunosuppressive medication and HTN presents to establish care, medical clearance prior to soft tissue mass/lipoma removal from left flank scheduled for 12/20 with Dr Mora. He has had multiple lipomas for many years but over the last 6 months they have been particularly causing discomfort in the left flank and side area. He denies any other acute complaints; no fever, chills, HA, dizziness, CP, SOB, abd pain, N/V/D, urinary concerns. His procedure was originally scheduled for 1/8 but it has been moved up to 12/20; PST is not yet scheduled but he will call them today to arrange this.

## 2019-12-16 ENCOUNTER — OUTPATIENT (OUTPATIENT)
Dept: OUTPATIENT SERVICES | Facility: HOSPITAL | Age: 64
LOS: 1 days | End: 2019-12-16

## 2019-12-16 VITALS
RESPIRATION RATE: 16 BRPM | OXYGEN SATURATION: 98 % | DIASTOLIC BLOOD PRESSURE: 90 MMHG | HEART RATE: 67 BPM | SYSTOLIC BLOOD PRESSURE: 140 MMHG | WEIGHT: 190.92 LBS | TEMPERATURE: 98 F | HEIGHT: 70 IN

## 2019-12-16 DIAGNOSIS — Z94.0 KIDNEY TRANSPLANT STATUS: Chronic | ICD-10-CM

## 2019-12-16 DIAGNOSIS — Z98.890 OTHER SPECIFIED POSTPROCEDURAL STATES: Chronic | ICD-10-CM

## 2019-12-16 DIAGNOSIS — D17.1 BENIGN LIPOMATOUS NEOPLASM OF SKIN AND SUBCUTANEOUS TISSUE OF TRUNK: ICD-10-CM

## 2019-12-16 NOTE — H&P PST ADULT - HISTORY OF PRESENT ILLNESS
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 Pt with soft tissue masses for a couple of years on torso, complaining of discomfort x 6 months. 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 Pt with soft tissue masses for a couple of years to torso, complaining of discomfort x 6 months.

## 2019-12-16 NOTE — H&P PST ADULT - NSICDXPASTMEDICALHX_GEN_ALL_CORE_FT
PAST MEDICAL HISTORY:  CKD (chronic kidney disease)     Gout     H/O polycystic kidney disease, autosomal dominant     H/O secondary hyperparathyroidism     Hypertension     Lipoma

## 2019-12-16 NOTE — H&P PST ADULT - ASSESSMENT
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 Pre-op instructions provided. Pt given verbal and written instructions with teach back on chlorhexidine shampoo. Pt verbalized understanding with return demonstration.     Medical evaluation with ekg and labs in chart.    Problem: HTN  Assessment and Plan: Pt instructed to take nifedipine on the morning of procedure.    Problem: Kidney transplant  Assessment and Plan: Pt instructed to take tacrolimus and mycophenolate on the morning of procedure.

## 2019-12-16 NOTE — H&P PST ADULT - NEGATIVE NEUROLOGICAL SYMPTOMS
Interval History: No major events overnight.    Review of Systems   Unable to perform ROS: Intubated     Objective:     Vital Signs (Most Recent):  Temp: 99.6 °F (37.6 °C) (06/24/17 1115)  Pulse: 81 (06/24/17 1215)  Resp: (!) 26 (06/24/17 1215)  BP: (!) 166/77 (06/24/17 1200)  SpO2: 96 % (06/24/17 1215) Vital Signs (24h Range):  Temp:  [98 °F (36.7 °C)-99.6 °F (37.6 °C)] 99.6 °F (37.6 °C)  Pulse:  [70-98] 81  Resp:  [0-40] 26  SpO2:  [92 %-98 %] 96 %  BP: (114-186)/(56-85) 166/77     Weight: (!) 158 kg (348 lb 5.2 oz)  Body mass index is 57.96 kg/m².    Intake/Output Summary (Last 24 hours) at 06/24/17 1234  Last data filed at 06/24/17 1200   Gross per 24 hour   Intake          2527.34 ml   Output             1975 ml   Net           552.34 ml      Physical Exam   Constitutional: No distress.   Morbidly obese   HENT:   ET tube in place  Increased edema on facial area.   Eyes: Conjunctivae are normal. Right eye exhibits no discharge. Left eye exhibits no discharge.   Neck: Neck supple.   Cardiovascular: Normal rate, regular rhythm and normal heart sounds.    Pulmonary/Chest: Breath sounds normal. No stridor.   Mechanically ventilated   Abdominal: Soft. Bowel sounds are normal.   Musculoskeletal: She exhibits edema. She exhibits no deformity.   Neurological:   Sedated   Skin: Skin is warm and dry.       Significant Labs:   BMP:     Recent Labs  Lab 06/24/17  0150   *   *   K 3.9      CO2 24   BUN 48*   CREATININE 1.3   CALCIUM 8.2*     CBC:     Recent Labs  Lab 06/23/17  0330   WBC 12.14   HGB 9.4*   HCT 29.3*          Significant Imaging: I have reviewed all pertinent imaging results/findings within the past 24 hours.   no transient paralysis/no generalized seizures/no tremors/no weakness/no paresthesias/no syncope/no difficulty walking

## 2019-12-16 NOTE — H&P PST ADULT - NSICDXPASTSURGICALHX_GEN_ALL_CORE_FT
PAST SURGICAL HISTORY:  H/O shoulder surgery labrum tear    H/O umbilical hernia repair     S/P kidney transplant 2014

## 2019-12-16 NOTE — H&P PST ADULT - RS GEN PE MLT RESP DETAILS PC
no rales/no wheezes/airway patent/clear to auscultation bilaterally/no rhonchi/good air movement/respirations non-labored/breath sounds equal

## 2019-12-16 NOTE — H&P PST ADULT - NSANTHOSAYNRD_GEN_A_CORE
No. BENNY screening performed.  STOP BANG Legend: 0-2 = LOW Risk; 3-4 = INTERMEDIATE Risk; 5-8 = HIGH Risk

## 2019-12-16 NOTE — H&P PST ADULT - NEGATIVE OPHTHALMOLOGIC SYMPTOMS
no diplopia/no blurred vision L/no pain L/no loss of vision R/no pain R/no loss of vision L/no photophobia/no blurred vision R

## 2019-12-16 NOTE — H&P PST ADULT - ATTENDING COMMENTS
Risks, benefits, and alternatives discussed with the patient - he expressed understanding and agrees to proceed with resection of left flank soft tissue masses.

## 2019-12-16 NOTE — H&P PST ADULT - NEGATIVE ENMT SYMPTOMS
no tinnitus/no nose bleeds/no vertigo/no sinus symptoms/no hearing difficulty/no ear pain/no throat pain/no dysphagia

## 2019-12-18 PROBLEM — M10.9 GOUT, UNSPECIFIED: Chronic | Status: ACTIVE | Noted: 2019-12-16

## 2019-12-18 PROBLEM — D17.9 BENIGN LIPOMATOUS NEOPLASM, UNSPECIFIED: Chronic | Status: ACTIVE | Noted: 2019-12-16

## 2019-12-18 PROBLEM — I10 ESSENTIAL (PRIMARY) HYPERTENSION: Chronic | Status: ACTIVE | Noted: 2019-12-16

## 2019-12-18 PROBLEM — Z87.448 PERSONAL HISTORY OF OTHER DISEASES OF URINARY SYSTEM: Chronic | Status: ACTIVE | Noted: 2019-12-16

## 2019-12-18 PROBLEM — Z86.39 PERSONAL HISTORY OF OTHER ENDOCRINE, NUTRITIONAL AND METABOLIC DISEASE: Chronic | Status: ACTIVE | Noted: 2019-12-16

## 2019-12-18 PROBLEM — N18.9 CHRONIC KIDNEY DISEASE, UNSPECIFIED: Chronic | Status: ACTIVE | Noted: 2019-12-16

## 2019-12-19 ENCOUNTER — TRANSCRIPTION ENCOUNTER (OUTPATIENT)
Age: 64
End: 2019-12-19

## 2019-12-20 ENCOUNTER — OUTPATIENT (OUTPATIENT)
Dept: OUTPATIENT SERVICES | Facility: HOSPITAL | Age: 64
LOS: 1 days | Discharge: HOME | End: 2019-12-20
Payer: MEDICARE

## 2019-12-20 ENCOUNTER — RESULT REVIEW (OUTPATIENT)
Age: 64
End: 2019-12-20

## 2019-12-20 ENCOUNTER — APPOINTMENT (OUTPATIENT)
Dept: SURGICAL ONCOLOGY | Facility: AMBULATORY SURGERY CENTER | Age: 64
End: 2019-12-20

## 2019-12-20 VITALS
RESPIRATION RATE: 15 BRPM | SYSTOLIC BLOOD PRESSURE: 127 MMHG | OXYGEN SATURATION: 96 % | HEART RATE: 62 BPM | DIASTOLIC BLOOD PRESSURE: 76 MMHG

## 2019-12-20 VITALS
DIASTOLIC BLOOD PRESSURE: 86 MMHG | HEART RATE: 77 BPM | HEIGHT: 70 IN | OXYGEN SATURATION: 98 % | WEIGHT: 190.92 LBS | RESPIRATION RATE: 16 BRPM | TEMPERATURE: 98 F | SYSTOLIC BLOOD PRESSURE: 140 MMHG

## 2019-12-20 DIAGNOSIS — Z94.0 KIDNEY TRANSPLANT STATUS: ICD-10-CM

## 2019-12-20 DIAGNOSIS — I10 ESSENTIAL (PRIMARY) HYPERTENSION: ICD-10-CM

## 2019-12-20 DIAGNOSIS — D17.1 BENIGN LIPOMATOUS NEOPLASM OF SKIN AND SUBCUTANEOUS TISSUE OF TRUNK: ICD-10-CM

## 2019-12-20 DIAGNOSIS — Z98.890 OTHER SPECIFIED POSTPROCEDURAL STATES: Chronic | ICD-10-CM

## 2019-12-20 DIAGNOSIS — Z94.0 KIDNEY TRANSPLANT STATUS: Chronic | ICD-10-CM

## 2019-12-20 PROCEDURE — 21931 EXC BACK LES SC 3 CM/>: CPT | Mod: 59

## 2019-12-20 PROCEDURE — 21930 EXC BACK LES SC < 3 CM: CPT | Mod: 59

## 2019-12-20 PROCEDURE — 88304 TISSUE EXAM BY PATHOLOGIST: CPT | Mod: 26

## 2019-12-20 PROCEDURE — 21932 EXC BACK TUM DEEP < 5 CM: CPT | Mod: 59

## 2019-12-20 NOTE — ASU DISCHARGE PLAN (ADULT/PEDIATRIC) - BATHING
Shower only/You may shower tomorrow. Apply ice pack to the area. Shower only/You may shower tomorrow over the dressing. Apply ice pack to the area.

## 2019-12-20 NOTE — ASU DISCHARGE PLAN (ADULT/PEDIATRIC) - CALL YOUR DOCTOR IF YOU HAVE ANY OF THE FOLLOWING:
Nausea and vomiting that does not stop/Swelling that gets worse/Bleeding that does not stop/Increased irritability or sluggishness/Pain not relieved by Medications/Numbness, tingling, color or temperature change to extremity/Fever greater than (need to indicate Fahrenheit or Celsius)/Wound/Surgical Site with redness, or foul smelling discharge or pus

## 2019-12-20 NOTE — BRIEF OPERATIVE NOTE - OPERATION/FINDINGS
3 radial incisions made, soft tissue masses excised, hemostasis achieved. Closed primarily with deep dermal Vicryl and skin closed jn subcuticular fashion.

## 2019-12-20 NOTE — ASU DISCHARGE PLAN (ADULT/PEDIATRIC) - FOLLOW UP APPOINTMENTS
911 or go to the nearest Emergency Room [facility] ([facility]):

## 2019-12-20 NOTE — ASU PREOP CHECKLIST - AICD PRESENT
Discharge instructions reviewed with patient Medication list and understanding of medications reviewed with patient. OTC and herbal medications reviewed and added to med list if applicable Barriers to adherence assessed. Guidance given regarding new medications this visit, including reason for taking this medicine, and common side effects. AVS given to patient explained patient expressed understands no

## 2019-12-20 NOTE — ASU DISCHARGE PLAN (ADULT/PEDIATRIC) - MEDICATION INSTRUCTIONS
You may alternate tylenol and motrin as needed every 3 hours You may alternate Tylenol and Motrin as needed every 3 hours. No Tylenol until 5:45PM

## 2019-12-31 LAB — SURGICAL PATHOLOGY STUDY: SIGNIFICANT CHANGE UP

## 2020-01-07 ENCOUNTER — APPOINTMENT (OUTPATIENT)
Dept: SURGICAL ONCOLOGY | Facility: CLINIC | Age: 65
End: 2020-01-07
Payer: MEDICARE

## 2020-01-07 VITALS
HEART RATE: 67 BPM | DIASTOLIC BLOOD PRESSURE: 82 MMHG | HEIGHT: 70 IN | BODY MASS INDEX: 26.92 KG/M2 | OXYGEN SATURATION: 98 % | WEIGHT: 188 LBS | SYSTOLIC BLOOD PRESSURE: 148 MMHG

## 2020-01-07 PROCEDURE — 99024 POSTOP FOLLOW-UP VISIT: CPT

## 2020-01-08 ENCOUNTER — APPOINTMENT (OUTPATIENT)
Dept: SURGICAL ONCOLOGY | Facility: AMBULATORY SURGERY CENTER | Age: 65
End: 2020-01-08

## 2020-01-08 NOTE — CONSULT LETTER
[Dear  ___] : Dear  [unfilled], [Please see my note below.] : Please see my note below. [Consult Letter:] : I had the pleasure of evaluating your patient, [unfilled]. [Consult Closing:] : Thank you very much for allowing me to participate in the care of this patient.  If you have any questions, please do not hesitate to contact me. [Sincerely,] : Sincerely, [DrLucio  ___] : Dr. DANIELLE [FreeTextEntry3] : Myke Mora MD, MPH, FACS\par Surgical Oncology\par [facility] [place]\par Associate Professor of Surgery\par Department of General Surgery\par [employer] [facility]

## 2020-01-08 NOTE — HISTORY OF PRESENT ILLNESS
[de-identified] : 64 year old man presents for an initial post op visit.  \par He initially presented 11/19/19 fo for left flank masses.  He is s/p renal transplant in 10/2014 from brother\par He is currently on immunosuppressive medication (Allopurinol, Mycophenolate Mofetil, Tacrolimus)\par He has several soft tissue masses on his torso, which have been stable in size for years.\par Lately he has noticed that two of the masses on his left flank are more prominent and causing him signficant discomfort with occasional sharp pain.\par Denies fevers or chills.\par No weight loss.\par \par INTERVAL HISTORY: \par ****SURGERY 12/20/19- S/p resection of left flank sot tissue masses. \par ****FINAL PATHOLOGY:\par 1) Left flank mass #1:  Lipoma\par 2) Left flank mass #2:  Lipoma with fat necrosis\par 3) Left flank mass #3:  Lipoma with fat necrosis \par \par 1/7/2020- Denies pain, fever or chills.

## 2020-01-08 NOTE — ASSESSMENT
[FreeTextEntry1] : 64 year old man with more prominent left flank soft tissue masses x 2, likely lipomas\par ****SURGERY 12/20/19- S/p resection of left flank sot tissue masses x 3 \par ****FINAL PATHOLOGY:\par 1) Left flank mass #1:  Lipoma\par 2) Left flank mass #2:  Lipoma with fat necrosis\par 3) Left flank mass #3:  Lipoma with fat necrosis \par \par Plan:\par 1) RTO PRN

## 2020-01-08 NOTE — PHYSICAL EXAM
[Normal] : full range of motion and no deformities appreciated [de-identified] : healing left flank incisions x 3 with no evidence of infection

## 2020-01-08 NOTE — REASON FOR VISIT
[Post-Op] : a post-op for [FreeTextEntry2] : s/p resection of left flank sot tissue masses x 3 on 12/20/19

## 2020-02-18 ENCOUNTER — RX RENEWAL (OUTPATIENT)
Age: 65
End: 2020-02-18

## 2020-02-20 ENCOUNTER — APPOINTMENT (OUTPATIENT)
Dept: MRI IMAGING | Facility: IMAGING CENTER | Age: 65
End: 2020-02-20
Payer: MEDICARE

## 2020-02-20 ENCOUNTER — OUTPATIENT (OUTPATIENT)
Dept: OUTPATIENT SERVICES | Facility: HOSPITAL | Age: 65
LOS: 1 days | End: 2020-02-20
Payer: MEDICARE

## 2020-02-20 DIAGNOSIS — Z98.890 OTHER SPECIFIED POSTPROCEDURAL STATES: Chronic | ICD-10-CM

## 2020-02-20 DIAGNOSIS — M54.16 RADICULOPATHY, LUMBAR REGION: ICD-10-CM

## 2020-02-20 DIAGNOSIS — Z94.0 KIDNEY TRANSPLANT STATUS: Chronic | ICD-10-CM

## 2020-02-20 PROCEDURE — 72148 MRI LUMBAR SPINE W/O DYE: CPT

## 2020-02-20 PROCEDURE — 72148 MRI LUMBAR SPINE W/O DYE: CPT | Mod: 26

## 2020-02-27 ENCOUNTER — RX RENEWAL (OUTPATIENT)
Age: 65
End: 2020-02-27

## 2020-03-08 ENCOUNTER — EMERGENCY (EMERGENCY)
Facility: HOSPITAL | Age: 65
LOS: 1 days | Discharge: HOME | End: 2020-03-08
Attending: EMERGENCY MEDICINE
Payer: MEDICARE

## 2020-03-08 VITALS
HEART RATE: 77 BPM | HEIGHT: 70 IN | OXYGEN SATURATION: 98 % | TEMPERATURE: 98 F | WEIGHT: 179.9 LBS | DIASTOLIC BLOOD PRESSURE: 78 MMHG | SYSTOLIC BLOOD PRESSURE: 127 MMHG | RESPIRATION RATE: 18 BRPM

## 2020-03-08 VITALS
DIASTOLIC BLOOD PRESSURE: 80 MMHG | HEART RATE: 72 BPM | TEMPERATURE: 98 F | SYSTOLIC BLOOD PRESSURE: 134 MMHG | RESPIRATION RATE: 18 BRPM | OXYGEN SATURATION: 95 %

## 2020-03-08 DIAGNOSIS — Z98.890 OTHER SPECIFIED POSTPROCEDURAL STATES: Chronic | ICD-10-CM

## 2020-03-08 DIAGNOSIS — Z94.0 KIDNEY TRANSPLANT STATUS: Chronic | ICD-10-CM

## 2020-03-08 DIAGNOSIS — R60.0 LOCALIZED EDEMA: ICD-10-CM

## 2020-03-08 LAB
ALBUMIN SERPL ELPH-MCNC: 3.4 G/DL — SIGNIFICANT CHANGE UP (ref 3.3–5)
ALP SERPL-CCNC: 292 U/L — HIGH (ref 40–120)
ALT FLD-CCNC: 50 U/L — HIGH (ref 10–45)
ANION GAP SERPL CALC-SCNC: 12 MMOL/L — SIGNIFICANT CHANGE UP (ref 5–17)
APTT BLD: 31.2 SEC — SIGNIFICANT CHANGE UP (ref 27.5–36.3)
AST SERPL-CCNC: 37 U/L — SIGNIFICANT CHANGE UP (ref 10–40)
BASOPHILS # BLD AUTO: 0.04 K/UL — SIGNIFICANT CHANGE UP (ref 0–0.2)
BASOPHILS NFR BLD AUTO: 0.4 % — SIGNIFICANT CHANGE UP (ref 0–2)
BILIRUB SERPL-MCNC: 0.9 MG/DL — SIGNIFICANT CHANGE UP (ref 0.2–1.2)
BUN SERPL-MCNC: 28 MG/DL — HIGH (ref 7–23)
CALCIUM SERPL-MCNC: 8.9 MG/DL — SIGNIFICANT CHANGE UP (ref 8.4–10.5)
CHLORIDE SERPL-SCNC: 104 MMOL/L — SIGNIFICANT CHANGE UP (ref 96–108)
CO2 SERPL-SCNC: 22 MMOL/L — SIGNIFICANT CHANGE UP (ref 22–31)
CREAT SERPL-MCNC: 1.39 MG/DL — HIGH (ref 0.5–1.3)
CRP SERPL-MCNC: 1.29 MG/DL — HIGH (ref 0–0.4)
EOSINOPHIL # BLD AUTO: 0.39 K/UL — SIGNIFICANT CHANGE UP (ref 0–0.5)
EOSINOPHIL NFR BLD AUTO: 4.3 % — SIGNIFICANT CHANGE UP (ref 0–6)
ERYTHROCYTE [SEDIMENTATION RATE] IN BLOOD: 4 MM/HR — SIGNIFICANT CHANGE UP (ref 0–20)
GLUCOSE SERPL-MCNC: 190 MG/DL — HIGH (ref 70–99)
HCT VFR BLD CALC: 39 % — SIGNIFICANT CHANGE UP (ref 39–50)
HGB BLD-MCNC: 12.6 G/DL — LOW (ref 13–17)
IMM GRANULOCYTES NFR BLD AUTO: 0.4 % — SIGNIFICANT CHANGE UP (ref 0–1.5)
INR BLD: 1.15 RATIO — SIGNIFICANT CHANGE UP (ref 0.88–1.16)
LYMPHOCYTES # BLD AUTO: 0.8 K/UL — LOW (ref 1–3.3)
LYMPHOCYTES # BLD AUTO: 8.8 % — LOW (ref 13–44)
MCHC RBC-ENTMCNC: 27 PG — SIGNIFICANT CHANGE UP (ref 27–34)
MCHC RBC-ENTMCNC: 32.3 GM/DL — SIGNIFICANT CHANGE UP (ref 32–36)
MCV RBC AUTO: 83.7 FL — SIGNIFICANT CHANGE UP (ref 80–100)
MONOCYTES # BLD AUTO: 1 K/UL — HIGH (ref 0–0.9)
MONOCYTES NFR BLD AUTO: 11 % — SIGNIFICANT CHANGE UP (ref 2–14)
NEUTROPHILS # BLD AUTO: 6.81 K/UL — SIGNIFICANT CHANGE UP (ref 1.8–7.4)
NEUTROPHILS NFR BLD AUTO: 75.1 % — SIGNIFICANT CHANGE UP (ref 43–77)
NRBC # BLD: 0 /100 WBCS — SIGNIFICANT CHANGE UP (ref 0–0)
PLATELET # BLD AUTO: 152 K/UL — SIGNIFICANT CHANGE UP (ref 150–400)
POTASSIUM SERPL-MCNC: 4.2 MMOL/L — SIGNIFICANT CHANGE UP (ref 3.5–5.3)
POTASSIUM SERPL-SCNC: 4.2 MMOL/L — SIGNIFICANT CHANGE UP (ref 3.5–5.3)
PROT SERPL-MCNC: 6.7 G/DL — SIGNIFICANT CHANGE UP (ref 6–8.3)
PROTHROM AB SERPL-ACNC: 13.3 SEC — HIGH (ref 10–12.9)
RBC # BLD: 4.66 M/UL — SIGNIFICANT CHANGE UP (ref 4.2–5.8)
RBC # FLD: 13.7 % — SIGNIFICANT CHANGE UP (ref 10.3–14.5)
SODIUM SERPL-SCNC: 138 MMOL/L — SIGNIFICANT CHANGE UP (ref 135–145)
WBC # BLD: 9.08 K/UL — SIGNIFICANT CHANGE UP (ref 3.8–10.5)
WBC # FLD AUTO: 9.08 K/UL — SIGNIFICANT CHANGE UP (ref 3.8–10.5)

## 2020-03-08 PROCEDURE — 93971 EXTREMITY STUDY: CPT | Mod: 26

## 2020-03-08 PROCEDURE — 99284 EMERGENCY DEPT VISIT MOD MDM: CPT | Mod: 25

## 2020-03-08 PROCEDURE — 86140 C-REACTIVE PROTEIN: CPT

## 2020-03-08 PROCEDURE — 85610 PROTHROMBIN TIME: CPT

## 2020-03-08 PROCEDURE — 85730 THROMBOPLASTIN TIME PARTIAL: CPT

## 2020-03-08 PROCEDURE — 85652 RBC SED RATE AUTOMATED: CPT

## 2020-03-08 PROCEDURE — 85027 COMPLETE CBC AUTOMATED: CPT

## 2020-03-08 PROCEDURE — 80053 COMPREHEN METABOLIC PANEL: CPT

## 2020-03-08 PROCEDURE — 99284 EMERGENCY DEPT VISIT MOD MDM: CPT | Mod: GC

## 2020-03-08 PROCEDURE — 93971 EXTREMITY STUDY: CPT

## 2020-03-08 RX ORDER — ACETAMINOPHEN 500 MG
975 TABLET ORAL ONCE
Refills: 0 | Status: COMPLETED | OUTPATIENT
Start: 2020-03-08 | End: 2020-03-08

## 2020-03-08 RX ORDER — APIXABAN 2.5 MG/1
10 TABLET, FILM COATED ORAL ONCE
Refills: 0 | Status: COMPLETED | OUTPATIENT
Start: 2020-03-08 | End: 2020-03-08

## 2020-03-08 RX ADMIN — Medication 975 MILLIGRAM(S): at 16:16

## 2020-03-08 RX ADMIN — APIXABAN 10 MILLIGRAM(S): 2.5 TABLET, FILM COATED ORAL at 16:16

## 2020-03-08 RX ADMIN — Medication 975 MILLIGRAM(S): at 14:44

## 2020-03-08 NOTE — ED ADULT NURSE NOTE - CHPI ED NUR SYMPTOMS NEG
no fever/no tingling/no weakness/no chills/no decreased eating/drinking/no dizziness/no nausea/no vomiting

## 2020-03-08 NOTE — ED ADULT NURSE REASSESSMENT NOTE - NS ED NURSE REASSESS COMMENT FT1
[address]essa LANE, pt verbalizes understanding to f/u with PCP, how take the 1st week Eliquis , then the 2nd week of Eliquis  and return to ED for any worsening symptoms.

## 2020-03-08 NOTE — ED ADULT NURSE NOTE - OBJECTIVE STATEMENT
65y M aaox4 ambulatory from home presents to ed for LLE swelling, as per pt he noted the swelling yesterday, called his nephrologist m( pt had kidney transplante/2014) and advice to come to Ed for r/o DVT. +pulses presents, warm to touch, capillary  refill 2 seconds. Pt denies CP, SOB, HA, vision changes, n/v/d, fevers chills, abdominal pain. Safety and comfort measures initiated- bed placed in lowest position and side rails raised. Pt oriented to call bell system.

## 2020-03-08 NOTE — ED PROVIDER NOTE - NSFOLLOWUPINSTRUCTIONS_ED_ALL_ED_FT
- Continue all regular medications  - for your DVT, take eliquis as prescribed  - For pain, take tylenol as directed on the packaging  - Follow up with Dr. Navas this week  - You were given copies of labs and/or imaging results if applicable, please take them to your follow up appointments  - Return to the ER for shortness of breath, fainting, chest pain, coughing up blood, bleeding from the rectum, blood in your urine, vomiting blood or any worsening symptoms or concerns

## 2020-03-08 NOTE — ED PROVIDER NOTE - CLINICAL SUMMARY MEDICAL DECISION MAKING FREE TEXT BOX
64yo immunosuppressed male no PE/DVT risk factors p/w LLE leg swelling likely DVT vs cellulitis. Less likely to be necrotizing fasciitis or fracture. DVT, XR, labs and reassess. 66yo immunosuppressed male no PE/DVT risk factors p/w LLE leg swelling likely DVT vs cellulitis. Less likely to be necrotizing fasciitis or fracture. DVT, XR, labs and reassess.    Attending Statement: Agree with the above.  LLE swelling at calf, +erythema, mild tenderness to palpation, no crepitation, fluctuance, overt e/o cellulitis.  Exam more c/w VTE though in absence of thrombus could represent infection given chronic immunosuppression.  No resp/pulm symptoms.  No back pain.  No sob.  No palps.  No lightheadedness.  Not hypoxic or tachypneic.  LLE is NVI thoughout.  Doubt arterial thrombus.  Needs duplex, XR, labs, will s/w nephrology if +DVT for ideal AC given ckd/immunosuppression.  --BMM

## 2020-03-08 NOTE — ED PROVIDER NOTE - PHYSICAL EXAMINATION
Physical Exam:  Gen: NAD, AOx3, non-toxic appearing, able to ambulate without assistance  Lung: CTAB, no respiratory distress, no wheezes/rhonchi/rales B/L, speaking in full sentences  CV: RRR, no murmurs, rubs or gallops, distal pulses 2+ b/l  Abd: soft, NT, ND, no guarding, no rigidity, no rebound tenderness, no CVA tenderness   MSK: minimal L calf TTP, no visible deformities, ROM normal in UE/LE, no back TTP  Neuro: No focal sensory or motor deficits  Skin: LLE swelling, warm, well perfused, no rash  Psych: normal affect, calm

## 2020-03-08 NOTE — ED PROVIDER NOTE - PROGRESS NOTE DETAILS
Katja PGY1: paged [facility] nephrology, awaiting callback Katja PGY1: spoke to nephrology consult, recommends eliquis. Patient reevaluated and feeling better. VSS. Reviewed and discussed results with patient. Discussed importance of follow up and return precautions. Patient agrees with plan.

## 2020-03-08 NOTE — ED PROVIDER NOTE - NS ED ROS FT
ROS:  GENERAL: No fever, no chills  CARDIAC: no chest pain  PULMONARY: no cough, no dyspnea  GI: no abdominal pain, no nausea, no vomiting, no diarrhea, no constipation  SKIN: no rashes, LLE swelling  NEURO: no headache, no weakness  MSK: LLE pain

## 2020-03-08 NOTE — ED PROVIDER NOTE - CARE PLAN
Principal Discharge DX:	Venous thrombosis of lower extremity Principal Discharge DX:	Venous thrombosis of lower extremity  Goal:	L

## 2020-03-08 NOTE — ED PROVIDER NOTE - PATIENT PORTAL LINK FT
You can access the FollowMyHealth Patient Portal offered by [facility] by registering at the following website: [email or web address]. By joining [facility]’s FollowMyHealth portal, you will also be able to view your health information using other applications (apps) compatible with our system.

## 2020-03-08 NOTE — ED PROVIDER NOTE - PMH
CKD (chronic kidney disease)    Gout    H/O polycystic kidney disease, autosomal dominant    H/O secondary hyperparathyroidism    Hypertension    Lipoma

## 2020-03-08 NOTE — ED PROVIDER NOTE - OBJECTIVE STATEMENT
64yo male L kidney transplant 2014, HTN, HLD, spinal stenosis p/w L leg swelling and pain 2 days ago. Denies dyspnea, chest pain, upper back pain, h/o DVT/PE, recent surgeries, recent long travel, hemoptysis, anticoagulation meds, fevers, trauma, any h/o smoking. Able to ambulate.     Nephrologist: Dr. Navas 66yo male L kidney transplant 2014, HTN, HLD, spinal stenosis p/w L leg swelling and pain 2 days ago. Denies dyspnea, chest pain, upper back pain, h/o DVT/PE, recent surgeries, recent long travel, hemoptysis, anticoagulation meds, fevers, trauma, any h/o smoking. Able to ambulate.     Nephrologist: Dr. Navas [phone]

## 2020-03-08 NOTE — ED ADULT NURSE REASSESSMENT NOTE - NS ED NURSE REASSESS COMMENT FT1
Patient being transported to ultrasound  with transport personnel. Patient stable upon leaving floor.

## 2020-03-09 RX ORDER — APIXABAN 2.5 MG/1
2 TABLET, FILM COATED ORAL
Qty: 28 | Refills: 0
Start: 2020-03-09 | End: 2020-03-15

## 2020-03-16 RX ORDER — APIXABAN 2.5 MG/1
1 TABLET, FILM COATED ORAL
Qty: 180 | Refills: 0
Start: 2020-03-16 | End: 2020-06-13

## 2020-03-20 ENCOUNTER — LABORATORY RESULT (OUTPATIENT)
Age: 65
End: 2020-03-20

## 2020-03-20 ENCOUNTER — APPOINTMENT (OUTPATIENT)
Dept: NEPHROLOGY | Facility: CLINIC | Age: 65
End: 2020-03-20

## 2020-03-20 DIAGNOSIS — M54.5 LOW BACK PAIN: ICD-10-CM

## 2020-03-20 RX ORDER — APIXABAN 5 MG/1
5 TABLET, FILM COATED ORAL
Refills: 0 | Status: ACTIVE | COMMUNITY
Start: 2020-03-20

## 2020-03-20 RX ORDER — LIDOCAINE 5% 700 MG/1
5 PATCH TOPICAL
Qty: 3 | Refills: 2 | Status: ACTIVE | COMMUNITY
Start: 2020-03-20 | End: 1900-01-01

## 2020-03-23 ENCOUNTER — INPATIENT (INPATIENT)
Facility: HOSPITAL | Age: 65
LOS: 7 days | Discharge: HOME | DRG: 435 | End: 2020-03-31
Attending: INTERNAL MEDICINE | Admitting: HOSPITALIST
Payer: MEDICARE

## 2020-03-23 VITALS
HEIGHT: 70 IN | SYSTOLIC BLOOD PRESSURE: 146 MMHG | WEIGHT: 158.07 LBS | OXYGEN SATURATION: 98 % | DIASTOLIC BLOOD PRESSURE: 76 MMHG | RESPIRATION RATE: 18 BRPM | TEMPERATURE: 101 F | HEART RATE: 96 BPM

## 2020-03-23 DIAGNOSIS — Z94.0 KIDNEY TRANSPLANT STATUS: Chronic | ICD-10-CM

## 2020-03-23 DIAGNOSIS — Z98.890 OTHER SPECIFIED POSTPROCEDURAL STATES: Chronic | ICD-10-CM

## 2020-03-23 LAB
25(OH)D3 SERPL-MCNC: 59 NG/ML
ALBUMIN SERPL ELPH-MCNC: 3.5 G/DL — SIGNIFICANT CHANGE UP (ref 3.3–5)
ALBUMIN SERPL ELPH-MCNC: 4 G/DL
ALP BLD-CCNC: 398 U/L
ALP SERPL-CCNC: 425 U/L — HIGH (ref 40–120)
ALT FLD-CCNC: 59 U/L — HIGH (ref 10–45)
ALT SERPL-CCNC: 48 U/L
ANION GAP SERPL CALC-SCNC: 16 MMOL/L
ANION GAP SERPL CALC-SCNC: 18 MMOL/L — HIGH (ref 5–17)
APPEARANCE: CLEAR
APTT BLD: 27.5 SEC — SIGNIFICANT CHANGE UP (ref 27.5–36.3)
AST SERPL-CCNC: 44 U/L
AST SERPL-CCNC: 65 U/L — HIGH (ref 10–40)
BASE EXCESS BLDV CALC-SCNC: -2.2 MMOL/L — LOW (ref -2–2)
BASOPHILS # BLD AUTO: 0.05 K/UL
BASOPHILS NFR BLD AUTO: 0.4 %
BILIRUB SERPL-MCNC: 1 MG/DL
BILIRUB SERPL-MCNC: 1.5 MG/DL — HIGH (ref 0.2–1.2)
BILIRUBIN URINE: ABNORMAL
BLOOD URINE: NEGATIVE
BUN SERPL-MCNC: 33 MG/DL
BUN SERPL-MCNC: 48 MG/DL — HIGH (ref 7–23)
CA-I SERPL-SCNC: 1.24 MMOL/L — SIGNIFICANT CHANGE UP (ref 1.12–1.3)
CALCIUM SERPL-MCNC: 9.5 MG/DL — SIGNIFICANT CHANGE UP (ref 8.4–10.5)
CALCIUM SERPL-MCNC: 9.9 MG/DL
CHLORIDE BLDV-SCNC: 102 MMOL/L — SIGNIFICANT CHANGE UP (ref 96–108)
CHLORIDE SERPL-SCNC: 97 MMOL/L — SIGNIFICANT CHANGE UP (ref 96–108)
CHLORIDE SERPL-SCNC: 99 MMOL/L
CHOLEST SERPL-MCNC: 148 MG/DL
CHOLEST/HDLC SERPL: 4.5 RATIO
CO2 BLDV-SCNC: 22 MMOL/L — SIGNIFICANT CHANGE UP (ref 22–30)
CO2 SERPL-SCNC: 18 MMOL/L — LOW (ref 22–31)
CO2 SERPL-SCNC: 23 MMOL/L
COLOR: ABNORMAL
CREAT SERPL-MCNC: 1.46 MG/DL
CREAT SERPL-MCNC: 2.25 MG/DL — HIGH (ref 0.5–1.3)
CREAT SPEC-SCNC: 415 MG/DL
CREAT/PROT UR: 0.2 RATIO
EOSINOPHIL # BLD AUTO: 0.62 K/UL
EOSINOPHIL NFR BLD AUTO: 5 %
ESTIMATED AVERAGE GLUCOSE: 154 MG/DL
GAS PNL BLDV: 134 MMOL/L — LOW (ref 135–145)
GAS PNL BLDV: SIGNIFICANT CHANGE UP
GAS PNL BLDV: SIGNIFICANT CHANGE UP
GLUCOSE BLDV-MCNC: 163 MG/DL — HIGH (ref 70–99)
GLUCOSE QUALITATIVE U: NEGATIVE
GLUCOSE SERPL-MCNC: 147 MG/DL
GLUCOSE SERPL-MCNC: 164 MG/DL — HIGH (ref 70–99)
HBA1C MFR BLD HPLC: 7 %
HCO3 BLDV-SCNC: 21 MMOL/L — SIGNIFICANT CHANGE UP (ref 21–29)
HCT VFR BLD CALC: 42.6 % — SIGNIFICANT CHANGE UP (ref 39–50)
HCT VFR BLD CALC: 44.9 %
HCT VFR BLDA CALC: 44 % — SIGNIFICANT CHANGE UP (ref 39–50)
HDLC SERPL-MCNC: 33 MG/DL
HGB BLD CALC-MCNC: 14.3 G/DL — SIGNIFICANT CHANGE UP (ref 13–17)
HGB BLD-MCNC: 13.6 G/DL — SIGNIFICANT CHANGE UP (ref 13–17)
HGB BLD-MCNC: 14 G/DL
IMM GRANULOCYTES NFR BLD AUTO: 0.5 %
INR BLD: 1.42 RATIO — HIGH (ref 0.88–1.16)
KETONES URINE: NEGATIVE
LACTATE BLDV-MCNC: 1.9 MMOL/L — SIGNIFICANT CHANGE UP (ref 0.7–2)
LDLC SERPL CALC-MCNC: 87 MG/DL
LEUKOCYTE ESTERASE URINE: ABNORMAL
LYMPHOCYTES # BLD AUTO: 1.72 K/UL
LYMPHOCYTES NFR BLD AUTO: 13.8 %
MAGNESIUM SERPL-MCNC: 2.1 MG/DL
MAN DIFF?: NORMAL
MCHC RBC-ENTMCNC: 26.3 PG — LOW (ref 27–34)
MCHC RBC-ENTMCNC: 26.8 PG
MCHC RBC-ENTMCNC: 31.2 GM/DL
MCHC RBC-ENTMCNC: 31.9 GM/DL — LOW (ref 32–36)
MCV RBC AUTO: 82.4 FL — SIGNIFICANT CHANGE UP (ref 80–100)
MCV RBC AUTO: 85.9 FL
MONOCYTES # BLD AUTO: 1.48 K/UL
MONOCYTES NFR BLD AUTO: 11.9 %
NEUTROPHILS # BLD AUTO: 8.5 K/UL
NEUTROPHILS NFR BLD AUTO: 68.4 %
NITRITE URINE: NEGATIVE
PCO2 BLDV: 35 MMHG — SIGNIFICANT CHANGE UP (ref 35–50)
PH BLDV: 7.4 — SIGNIFICANT CHANGE UP (ref 7.35–7.45)
PH URINE: 5.5
PLATELET # BLD AUTO: 241 K/UL — SIGNIFICANT CHANGE UP (ref 150–400)
PLATELET # BLD AUTO: 264 K/UL
PO2 BLDV: 38 MMHG — SIGNIFICANT CHANGE UP (ref 25–45)
POTASSIUM BLDV-SCNC: 4.7 MMOL/L — SIGNIFICANT CHANGE UP (ref 3.5–5.3)
POTASSIUM SERPL-MCNC: 4.9 MMOL/L — SIGNIFICANT CHANGE UP (ref 3.5–5.3)
POTASSIUM SERPL-SCNC: 4.7 MMOL/L
POTASSIUM SERPL-SCNC: 4.9 MMOL/L — SIGNIFICANT CHANGE UP (ref 3.5–5.3)
PROT SERPL-MCNC: 6.9 G/DL
PROT SERPL-MCNC: 7.3 G/DL — SIGNIFICANT CHANGE UP (ref 6–8.3)
PROT UR-MCNC: 70 MG/DL
PROTEIN URINE: ABNORMAL
PROTHROM AB SERPL-ACNC: 16.4 SEC — HIGH (ref 10–12.9)
RBC # BLD: 5.17 M/UL — SIGNIFICANT CHANGE UP (ref 4.2–5.8)
RBC # BLD: 5.23 M/UL
RBC # FLD: 13.9 % — SIGNIFICANT CHANGE UP (ref 10.3–14.5)
RBC # FLD: 14.2 %
SAO2 % BLDV: 68 % — SIGNIFICANT CHANGE UP (ref 67–88)
SODIUM SERPL-SCNC: 133 MMOL/L — LOW (ref 135–145)
SODIUM SERPL-SCNC: 138 MMOL/L
SPECIFIC GRAVITY URINE: 1.04
TACROLIMUS SERPL-MCNC: 7.7 NG/ML
TRIGL SERPL-MCNC: 139 MG/DL
URATE SERPL-MCNC: 5.4 MG/DL
UROBILINOGEN URINE: ABNORMAL
WBC # BLD: 17.64 K/UL — HIGH (ref 3.8–10.5)
WBC # FLD AUTO: 12.43 K/UL
WBC # FLD AUTO: 17.64 K/UL — HIGH (ref 3.8–10.5)

## 2020-03-23 PROCEDURE — 71045 X-RAY EXAM CHEST 1 VIEW: CPT | Mod: 26

## 2020-03-23 PROCEDURE — 99285 EMERGENCY DEPT VISIT HI MDM: CPT

## 2020-03-23 RX ORDER — ACETAMINOPHEN 500 MG
650 TABLET ORAL ONCE
Refills: 0 | Status: COMPLETED | OUTPATIENT
Start: 2020-03-23 | End: 2020-03-23

## 2020-03-23 RX ADMIN — Medication 650 MILLIGRAM(S): at 23:57

## 2020-03-23 NOTE — ED PROVIDER NOTE - PHYSICAL EXAMINATION
Const: Well-nourished, Well-developed, appearing stated age.  Eyes: PERRL, no conjunctival injection, and symmetrical lids.  HEENT: Head NCAT, no lesions. Atraumatic external nose and ears.   Neck: Symmetric, trachea midline, No thyromegaly.  CVS: +S1/S2, Peripheral pulses 2+ and equal in all extremities.  RESP: Unlabored respiratory effort. Clear to auscultation bilaterally, no crackles.   GI: +diffusely tender abdomen, worse in b/l LQ, no rebound or rigidity, nondistended, No hepatosplenomegaly, no cva tenderness b/l.   MSK: Normocephalic/Atraumatic, Extremities w/o deformity or ttp. No midline tenderness along spine   Skin: Warm, dry and intact. No rashes or lesions.  Neuro: CNs II-XII grossly intact. Motor & Sensation grossly intact.  Psych: Awake, Alert, & Oriented (AAO) x3. Appropriate mood and affect.

## 2020-03-23 NOTE — ED PROVIDER NOTE - ATTENDING CONTRIBUTION TO CARE
MD Alexis:  patient seen and evaluated personally.   I agree with the History & Physical,  Impression & Plan other than what was detailed in my note.  MD Alexis    64yo male L kidney transplant 2014, HTN, HLD, spinal stenosis p/w, dvt on eliquis, tacrolimus, presenting to ed w/ fever.  febrile in ed other vitals stable MD Alexis:  patient seen and evaluated personally.   I agree with the History & Physical,  Impression & Plan other than what was detailed in my note.  MD Alexis    66yo male L kidney transplant 2014, HTN, HLD, spinal stenosis p/w, dvt on eliquis, tacrolimus, presenting to ed w/ fever.  Denies cp, palp, n/v, diarrhea, told resident had pos sob, no urinary symptoms. no rash, no sore throat, ear pain , febrile in ed other vitals stable, well appearing, abd soft nt, left leg slightly bigger than left, non tender, (recent dx of dvt, pt states getting smaller). given immunocmpromised w/ fever will get septic workup, blood, urine cx, swabbed for covid, cxr. labs, re evaluate

## 2020-03-23 NOTE — ED ADULT NURSE NOTE - NSIMPLEMENTINTERV_GEN_ALL_ED
Implemented All Fall with Harm Risk Interventions:  [place] to call system. Call bell, personal items and telephone within reach. Instruct patient to call for assistance. Room bathroom lighting operational. Non-slip footwear when patient is off stretcher. Physically safe environment: no spills, clutter or unnecessary equipment. Stretcher in lowest position, wheels locked, appropriate side rails in place. Provide visual cue, wrist band, yellow gown, etc. Monitor gait and stability. Monitor for mental status changes and reorient to person, place, and time. Review medications for side effects contributing to fall risk. Reinforce activity limits and safety measures with patient and family. Provide visual clues: red socks.

## 2020-03-23 NOTE — ED PROVIDER NOTE - CLINICAL SUMMARY MEDICAL DECISION MAKING FREE TEXT BOX
65M presenting with weakness and fever (renal transplant hx) PE: VSS here (febrile per EMS), mild abd tenderness Ddx: Sepsis 2/2 to urine vs pna vs intraabdominal, less likely spinal abscess given no neuro symptoms (pain likely chronic) Plan:  ED sepsis, labs, urine, CXR, likely to be admitted.

## 2020-03-23 NOTE — ED ADULT NURSE NOTE - CHIEF COMPLAINT QUOTE
generalized weakness, fever tmax 101F, hx kidney transplant on immunosuppressants;   Addendum; daughter arnel called and 1950 states pt has blood clots in leg and is on anticoagulants

## 2020-03-23 NOTE — ED ADULT NURSE NOTE - OBJECTIVE STATEMENT
65 year old A&Ox3 male presents to ED in wheelchair complaining of fever  x 1 day, weakness x 1 week.    PMH L kidney transplant on tacrolimus, HTN, recent L calf blood clot now on Eliquis. + 30lb unintentional weight loss x 3 months. +LLQ pain, confirms decrease in urine output with concentrated urine. Confirms mild SOB, 99% on RA, NAD, speaking full sentences, lungs diminished throughout. 99.5 orally. Sepsis protocol initiated, 2 large bore IVs started, blood cultures drawn x 2. Denies CP, n/v/d,  numbness, tingling in upper and lower extremities, HA, blurry vision. VSS updated on plan of care.

## 2020-03-23 NOTE — ED PROVIDER NOTE - OBJECTIVE STATEMENT
66yo male L kidney transplant 2014, HTN, HLD, hx of spinal stenosis presenting with CC of weakness and back pain for one week. +fevers today, up to 101F at home. +SOB. Denies cough, nausea, vomiting, sore throat, congestion. Denies urinary/bowel incontinence, loss of sensation in LE, trouble ambulating, saddle anesthesia. No dysuria. Called his nephrologist today with symptoms, was told to come in. No known sick contacts.     Nephrologist: Dr. Navas [phone]

## 2020-03-23 NOTE — ED PROVIDER NOTE - NS ED ROS FT
CONST: + fevers, + chills, no trauma  EYES: no pain, no visual disturbances  ENT: no sore throat, no epistaxis, no rhinorrhea, no hearing changes  CV: no chest pain, no palpitations, no orthopnea, no extremity pain or swelling  RESP: + shortness of breath, no cough, no sputum, no pleurisy, no wheezing  ABD: + abdominal pain, no nausea, no vomiting, no diarrhea, no black or bloody stool  : no dysuria, no hematuria, +decreased frequency, no urgency  MSK: + back pain, no neck pain, no extremity pain  NEURO: no headache, no sensory disturbances, no focal weakness, no dizziness  SKIN: no diaphoresis, no rash

## 2020-03-23 NOTE — ED ADULT TRIAGE NOTE - CHIEF COMPLAINT QUOTE
generalized weakness, fever tmax 101F generalized weakness, fever tmax 101F, hx kidney transplant on immunosuppressants generalized weakness, fever tmax 101F, hx kidney transplant on immunosuppressants;   Addendum; daughter arnel called and 1950 states pt has blood clots in leg and is on anticoagulants

## 2020-03-23 NOTE — ED PROVIDER NOTE - PROGRESS NOTE DETAILS
Transplant saw patient in the ED,  admission to go to medicine. Nephrology aware of patient, to see them in the morning. Wei Alcala, PGY1

## 2020-03-24 ENCOUNTER — TRANSCRIPTION ENCOUNTER (OUTPATIENT)
Age: 65
End: 2020-03-24

## 2020-03-24 ENCOUNTER — APPOINTMENT (OUTPATIENT)
Dept: ULTRASOUND IMAGING | Facility: IMAGING CENTER | Age: 65
End: 2020-03-24

## 2020-03-24 DIAGNOSIS — N39.0 URINARY TRACT INFECTION, SITE NOT SPECIFIED: ICD-10-CM

## 2020-03-24 DIAGNOSIS — N17.9 ACUTE KIDNEY FAILURE, UNSPECIFIED: ICD-10-CM

## 2020-03-24 DIAGNOSIS — R74.0 NONSPECIFIC ELEVATION OF LEVELS OF TRANSAMINASE AND LACTIC ACID DEHYDROGENASE [LDH]: ICD-10-CM

## 2020-03-24 DIAGNOSIS — R63.8 OTHER SYMPTOMS AND SIGNS CONCERNING FOOD AND FLUID INTAKE: ICD-10-CM

## 2020-03-24 DIAGNOSIS — I10 ESSENTIAL (PRIMARY) HYPERTENSION: ICD-10-CM

## 2020-03-24 DIAGNOSIS — A41.9 SEPSIS, UNSPECIFIED ORGANISM: ICD-10-CM

## 2020-03-24 DIAGNOSIS — M54.9 DORSALGIA, UNSPECIFIED: ICD-10-CM

## 2020-03-24 DIAGNOSIS — Z02.9 ENCOUNTER FOR ADMINISTRATIVE EXAMINATIONS, UNSPECIFIED: ICD-10-CM

## 2020-03-24 LAB
ANION GAP SERPL CALC-SCNC: 11 MMOL/L — SIGNIFICANT CHANGE UP (ref 5–17)
APPEARANCE UR: ABNORMAL
BACTERIA # UR AUTO: NEGATIVE — SIGNIFICANT CHANGE UP
BASOPHILS # BLD AUTO: 0 K/UL — SIGNIFICANT CHANGE UP (ref 0–0.2)
BASOPHILS NFR BLD AUTO: 0 % — SIGNIFICANT CHANGE UP (ref 0–2)
BILIRUB UR-MCNC: ABNORMAL
BUN SERPL-MCNC: 44 MG/DL — HIGH (ref 7–23)
BURR CELLS BLD QL SMEAR: PRESENT — SIGNIFICANT CHANGE UP
CALCIUM SERPL-MCNC: 8.8 MG/DL — SIGNIFICANT CHANGE UP (ref 8.4–10.5)
CHLORIDE SERPL-SCNC: 100 MMOL/L — SIGNIFICANT CHANGE UP (ref 96–108)
CO2 SERPL-SCNC: 22 MMOL/L — SIGNIFICANT CHANGE UP (ref 22–31)
COLOR SPEC: ABNORMAL
CREAT SERPL-MCNC: 1.74 MG/DL — HIGH (ref 0.5–1.3)
DIFF PNL FLD: NEGATIVE — SIGNIFICANT CHANGE UP
ELLIPTOCYTES BLD QL SMEAR: SLIGHT — SIGNIFICANT CHANGE UP
EOSINOPHIL # BLD AUTO: 0.16 K/UL — SIGNIFICANT CHANGE UP (ref 0–0.5)
EOSINOPHIL NFR BLD AUTO: 0.9 % — SIGNIFICANT CHANGE UP (ref 0–6)
EPI CELLS # UR: 1 /HPF — SIGNIFICANT CHANGE UP
GIANT PLATELETS BLD QL SMEAR: PRESENT — SIGNIFICANT CHANGE UP
GLUCOSE SERPL-MCNC: 155 MG/DL — HIGH (ref 70–99)
GLUCOSE UR QL: NEGATIVE — SIGNIFICANT CHANGE UP
HYALINE CASTS # UR AUTO: 7 /LPF — HIGH (ref 0–2)
KETONES UR-MCNC: NEGATIVE — SIGNIFICANT CHANGE UP
LEUKOCYTE ESTERASE UR-ACNC: ABNORMAL
LYMPHOCYTES # BLD AUTO: 0.78 K/UL — LOW (ref 1–3.3)
LYMPHOCYTES # BLD AUTO: 4.4 % — LOW (ref 13–44)
MANUAL SMEAR VERIFICATION: SIGNIFICANT CHANGE UP
MONOCYTES # BLD AUTO: 1.83 K/UL — HIGH (ref 0–0.9)
MONOCYTES NFR BLD AUTO: 10.4 % — SIGNIFICANT CHANGE UP (ref 2–14)
NEUTROPHILS # BLD AUTO: 14.87 K/UL — HIGH (ref 1.8–7.4)
NEUTROPHILS NFR BLD AUTO: 82.6 % — HIGH (ref 43–77)
NEUTS BAND # BLD: 1.7 % — SIGNIFICANT CHANGE UP (ref 0–8)
NITRITE UR-MCNC: NEGATIVE — SIGNIFICANT CHANGE UP
OVALOCYTES BLD QL SMEAR: SIGNIFICANT CHANGE UP
PH UR: 5.5 — SIGNIFICANT CHANGE UP (ref 5–8)
PLAT MORPH BLD: NORMAL — SIGNIFICANT CHANGE UP
POIKILOCYTOSIS BLD QL AUTO: SIGNIFICANT CHANGE UP
POTASSIUM SERPL-MCNC: 5 MMOL/L — SIGNIFICANT CHANGE UP (ref 3.5–5.3)
POTASSIUM SERPL-SCNC: 5 MMOL/L — SIGNIFICANT CHANGE UP (ref 3.5–5.3)
PROT UR-MCNC: ABNORMAL
RBC BLD AUTO: ABNORMAL
RBC CASTS # UR COMP ASSIST: 3 /HPF — SIGNIFICANT CHANGE UP (ref 0–4)
SARS-COV-2 RNA SPEC QL NAA+PROBE: SIGNIFICANT CHANGE UP
SCHISTOCYTES BLD QL AUTO: SLIGHT — SIGNIFICANT CHANGE UP
SODIUM SERPL-SCNC: 133 MMOL/L — LOW (ref 135–145)
SP GR SPEC: 1.03 — HIGH (ref 1.01–1.02)
UROBILINOGEN FLD QL: ABNORMAL
WBC UR QL: 12 /HPF — HIGH (ref 0–5)

## 2020-03-24 PROCEDURE — 99223 1ST HOSP IP/OBS HIGH 75: CPT

## 2020-03-24 PROCEDURE — 74176 CT ABD & PELVIS W/O CONTRAST: CPT | Mod: 26

## 2020-03-24 RX ORDER — CEFEPIME 1 G/1
1000 INJECTION, POWDER, FOR SOLUTION INTRAMUSCULAR; INTRAVENOUS ONCE
Refills: 0 | Status: COMPLETED | OUTPATIENT
Start: 2020-03-24 | End: 2020-03-24

## 2020-03-24 RX ORDER — ATORVASTATIN CALCIUM 80 MG/1
1 TABLET, FILM COATED ORAL
Qty: 0 | Refills: 0 | DISCHARGE

## 2020-03-24 RX ORDER — TRAMADOL HYDROCHLORIDE 50 MG/1
25 TABLET ORAL EVERY 6 HOURS
Refills: 0 | Status: DISCONTINUED | OUTPATIENT
Start: 2020-03-24 | End: 2020-03-25

## 2020-03-24 RX ORDER — OXYCODONE HYDROCHLORIDE 5 MG/1
5 TABLET ORAL ONCE
Refills: 0 | Status: DISCONTINUED | OUTPATIENT
Start: 2020-03-24 | End: 2020-03-24

## 2020-03-24 RX ORDER — SODIUM CHLORIDE 9 MG/ML
1000 INJECTION INTRAMUSCULAR; INTRAVENOUS; SUBCUTANEOUS ONCE
Refills: 0 | Status: COMPLETED | OUTPATIENT
Start: 2020-03-24 | End: 2020-03-24

## 2020-03-24 RX ORDER — GABAPENTIN 400 MG/1
1 CAPSULE ORAL
Qty: 0 | Refills: 0 | DISCHARGE

## 2020-03-24 RX ORDER — INFLUENZA VIRUS VACCINE 15; 15; 15; 15 UG/.5ML; UG/.5ML; UG/.5ML; UG/.5ML
0.5 SUSPENSION INTRAMUSCULAR ONCE
Refills: 0 | Status: DISCONTINUED | OUTPATIENT
Start: 2020-03-24 | End: 2020-03-31

## 2020-03-24 RX ORDER — ALLOPURINOL 300 MG
0 TABLET ORAL
Qty: 0 | Refills: 0 | DISCHARGE

## 2020-03-24 RX ORDER — HYDROMORPHONE HYDROCHLORIDE 2 MG/ML
0.5 INJECTION INTRAMUSCULAR; INTRAVENOUS; SUBCUTANEOUS EVERY 6 HOURS
Refills: 0 | Status: DISCONTINUED | OUTPATIENT
Start: 2020-03-24 | End: 2020-03-30

## 2020-03-24 RX ORDER — LIDOCAINE 4 G/100G
1 CREAM TOPICAL
Qty: 0 | Refills: 0 | DISCHARGE

## 2020-03-24 RX ORDER — MYCOPHENOLATE MOFETIL 250 MG/1
500 CAPSULE ORAL
Refills: 0 | Status: DISCONTINUED | OUTPATIENT
Start: 2020-03-24 | End: 2020-03-25

## 2020-03-24 RX ORDER — APIXABAN 2.5 MG/1
5 TABLET, FILM COATED ORAL
Refills: 0 | Status: DISCONTINUED | OUTPATIENT
Start: 2020-03-24 | End: 2020-03-25

## 2020-03-24 RX ORDER — CEFTRIAXONE 500 MG/1
1000 INJECTION, POWDER, FOR SOLUTION INTRAMUSCULAR; INTRAVENOUS ONCE
Refills: 0 | Status: DISCONTINUED | OUTPATIENT
Start: 2020-03-24 | End: 2020-03-24

## 2020-03-24 RX ORDER — CHLORHEXIDINE GLUCONATE 213 G/1000ML
1 SOLUTION TOPICAL DAILY
Refills: 0 | Status: DISCONTINUED | OUTPATIENT
Start: 2020-03-24 | End: 2020-03-24

## 2020-03-24 RX ORDER — MYCOPHENOLATE MOFETIL 250 MG/1
500 CAPSULE ORAL ONCE
Refills: 0 | Status: COMPLETED | OUTPATIENT
Start: 2020-03-24 | End: 2020-03-24

## 2020-03-24 RX ORDER — TACROLIMUS 5 MG/1
2 CAPSULE ORAL AT BEDTIME
Refills: 0 | Status: DISCONTINUED | OUTPATIENT
Start: 2020-03-24 | End: 2020-03-29

## 2020-03-24 RX ORDER — ACETAMINOPHEN 500 MG
650 TABLET ORAL ONCE
Refills: 0 | Status: COMPLETED | OUTPATIENT
Start: 2020-03-24 | End: 2020-03-24

## 2020-03-24 RX ORDER — ACETAMINOPHEN 500 MG
650 TABLET ORAL EVERY 6 HOURS
Refills: 0 | Status: DISCONTINUED | OUTPATIENT
Start: 2020-03-24 | End: 2020-03-31

## 2020-03-24 RX ORDER — CEFTRIAXONE 500 MG/1
1000 INJECTION, POWDER, FOR SOLUTION INTRAMUSCULAR; INTRAVENOUS EVERY 24 HOURS
Refills: 0 | Status: DISCONTINUED | OUTPATIENT
Start: 2020-03-24 | End: 2020-03-27

## 2020-03-24 RX ORDER — TACROLIMUS 5 MG/1
1 CAPSULE ORAL
Refills: 0 | Status: DISCONTINUED | OUTPATIENT
Start: 2020-03-24 | End: 2020-03-29

## 2020-03-24 RX ORDER — TACROLIMUS 5 MG/1
1 CAPSULE ORAL ONCE
Refills: 0 | Status: COMPLETED | OUTPATIENT
Start: 2020-03-24 | End: 2020-03-24

## 2020-03-24 RX ADMIN — TRAMADOL HYDROCHLORIDE 25 MILLIGRAM(S): 50 TABLET ORAL at 21:00

## 2020-03-24 RX ADMIN — MYCOPHENOLATE MOFETIL 500 MILLIGRAM(S): 250 CAPSULE ORAL at 13:51

## 2020-03-24 RX ADMIN — TRAMADOL HYDROCHLORIDE 25 MILLIGRAM(S): 50 TABLET ORAL at 20:23

## 2020-03-24 RX ADMIN — OXYCODONE HYDROCHLORIDE 5 MILLIGRAM(S): 5 TABLET ORAL at 13:56

## 2020-03-24 RX ADMIN — Medication 650 MILLIGRAM(S): at 12:47

## 2020-03-24 RX ADMIN — Medication 650 MILLIGRAM(S): at 20:20

## 2020-03-24 RX ADMIN — CEFEPIME 100 MILLIGRAM(S): 1 INJECTION, POWDER, FOR SOLUTION INTRAMUSCULAR; INTRAVENOUS at 01:37

## 2020-03-24 RX ADMIN — Medication 650 MILLIGRAM(S): at 11:47

## 2020-03-24 RX ADMIN — CEFTRIAXONE 100 MILLIGRAM(S): 500 INJECTION, POWDER, FOR SOLUTION INTRAMUSCULAR; INTRAVENOUS at 18:11

## 2020-03-24 RX ADMIN — SODIUM CHLORIDE 1000 MILLILITER(S): 9 INJECTION INTRAMUSCULAR; INTRAVENOUS; SUBCUTANEOUS at 00:58

## 2020-03-24 RX ADMIN — APIXABAN 5 MILLIGRAM(S): 2.5 TABLET, FILM COATED ORAL at 18:11

## 2020-03-24 RX ADMIN — TACROLIMUS 2 MILLIGRAM(S): 5 CAPSULE ORAL at 20:00

## 2020-03-24 RX ADMIN — OXYCODONE HYDROCHLORIDE 5 MILLIGRAM(S): 5 TABLET ORAL at 14:45

## 2020-03-24 RX ADMIN — MYCOPHENOLATE MOFETIL 500 MILLIGRAM(S): 250 CAPSULE ORAL at 20:00

## 2020-03-24 RX ADMIN — TACROLIMUS 1 MILLIGRAM(S): 5 CAPSULE ORAL at 13:51

## 2020-03-24 NOTE — CHART NOTE - NSCHARTNOTEFT_GEN_A_CORE
Pt with febrile at 101.4 without associated tachycardia or hypotension. Currently on Rocephin for UTI; Blood and urine cultures spent (3/23) and pending. Tylenol given

## 2020-03-24 NOTE — H&P ADULT - PROBLEM SELECTOR PLAN 3
-Patient with upper back pain, R>L currently, acute on chronic. Differential includes: infectious etiology (pyelo vs cyst infection) vs referred pain from possibly malignancy (given weight loss and transaminitis) vs musculoskeletal given heavy lifting at job  -will obtain CT abdomen, renal u/s, CMV pcr, hep panel  -pain control w/ tramadol for moderate and dilaudid for severe (avoid tylenol and oxy given zelda)

## 2020-03-24 NOTE — H&P ADULT - PROBLEM SELECTOR PLAN 4
-Patient w/ transaminitis. differential includes: from recent heavy tylenol use vs malignancy r/o  -will obtain hep panel, CMV pcr, and CT abdomen imaging

## 2020-03-24 NOTE — H&P ADULT - HISTORY OF PRESENT ILLNESS
66y/o male w/ pmhx of L kidney transplant 2014, polycystic kidney disease, HTN, hx of spinal stenosis presenting with complaints of acute on chronic upper back pain now for one week. He states he has spinal stenosis, and has been suffering from back pain for the last 5 months, which has become progressively worse this past week, and with associated fevers of 101 at home. This back pain is sharp, 6-7/10 pain at its worst, does not get worse with movement. No associated loss of sensation, fecal/urine incontinence, numbness/tingling. He states he has some intermittent SOB at times and also notes loss of appetite, weightloss of 25 pounds in the last 2.5 months. He states for the back pain, he has been seeing orthopedics, has received steroid injections with no relief. Now, he has been taking aleve and tylenol around the clock, very frequently this past week. He denies CP, palpitations, abdominal pain, changes in bowel habits. Denies dysuria, hematuria. No recent sick contacts. Has been isolating himself. 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 He has since stopped doing construction, but has been working another job, lifting heavy delivery boxes, stopped this job three weeks ago.     He states he was planning to get epidural injections, but then had a recent LLE DVT diagnosed March 8th (Acute thrombus extending from the left popliteal vein into the posterior tibial vein, peroneal vein, soleal vein and gastrocnemius vein), and was started on eliquis, making him currently ineligible to get epidural injections.

## 2020-03-24 NOTE — H&P ADULT - NSHPREVIEWOFSYSTEMS_GEN_ALL_CORE
REVIEW OF SYSTEMS:  CONSTITUTIONAL: + fever, + weight loss. no fatigue  EYES: No eye pain, visual disturbances, or discharge  ENMT:  No difficulty hearing, tinnitus, vertigo; No sinus or throat pain  NECK: No pain or stiffness  RESPIRATORY: No cough, wheezing, chills or hemoptysis; + shortness of breath  CARDIOVASCULAR: No chest pain, palpitations, dizziness, or leg swelling  GASTROINTESTINAL: No abdominal or epigastric pain. No nausea, vomiting, or hematemesis; No diarrhea or constipation. No melena or hematochezia.  GENITOURINARY: No dysuria, frequency, hematuria, or incontinence  NEUROLOGICAL: No headaches, loss of strength, numbness  SKIN: No itching, burning, rashes, or lesions   MUSCULOSKELETAL: No joint pain or swelling; +back pain  HEME/LYMPH: No easy bruising, or bleeding gums  ALLERY AND IMMUNOLOGIC: No hives or eczema

## 2020-03-24 NOTE — H&P ADULT - PROBLEM SELECTOR PLAN 1
-Patient w. leukocytosis, fever, and U/A with +LE, WBC, will treat for UTI.  -patient does have back pain, more upper back pain, not flank pain, which would be more typical of pyelo  -will f/u Bcx, UCx, continue with ceftriaxone

## 2020-03-24 NOTE — H&P ADULT - PROBLEM SELECTOR PLAN 5
-Cr. currently 2.25 (baseline 1.3)  -s/p 1L IVF, likely from infection vs pre-renal  -s/p 1L IVF, will repeat BMP  -renal ultrasound ordered to r/o hydro

## 2020-03-24 NOTE — H&P ADULT - NSHPSOCIALHISTORY_GEN_ALL_CORE
Social History:    Marital Status:  (   )    (   ) Single    (   )    (  )   Occupation:   Lives with: (  ) alone  (  ) children   (  ) spouse   (  ) parents  (  ) other    Substance Use (street drugs): (  ) never used  (  ) other:  Tobacco Usage:  (   ) never smoked   (   ) former smoker   (   ) current smoker  (     ) pack year  (        ) last cigarette date  Alcohol Usage:  Sexual History: Social History:    Marital Status:  (   )    (   ) Single    (   )    ( x )   Occupation: not working currently  Lives with: ( x ) alone  (  ) children   (  ) spouse   (  ) parents  (  ) other    Substance Use (street drugs): ( x ) never used  (  ) other:  Tobacco Usage:  (  x ) never smoked   (   ) former smoker   (   ) current smoker  (     ) pack year  (        ) last cigarette date  Alcohol Usage: none

## 2020-03-24 NOTE — DISCHARGE NOTE NURSING/CASE MANAGEMENT/SOCIAL WORK - PATIENT PORTAL LINK FT
You can access the FollowMyHealth Patient Portal offered by [facility] by registering at the following website: [email or web address]. By joining [facility]’s FollowMyHealth portal, you will also be able to view your health information using other applications (apps) compatible with our system.

## 2020-03-24 NOTE — H&P ADULT - NSHPPHYSICALEXAM_GEN_ALL_CORE
Vital Signs Last 24 Hrs  T(C): 36.9 (24 Mar 2020 10:15), Max: 38.3 (23 Mar 2020 18:59)  T(F): 98.5 (24 Mar 2020 10:15), Max: 101 (23 Mar 2020 18:59)  HR: 66 (24 Mar 2020 10:15) (60 - 96)  BP: 129/74 (24 Mar 2020 10:15) (116/80 - 146/76)  BP(mean): --  RR: 18 (24 Mar 2020 10:15) (16 - 20)  SpO2: 96% (24 Mar 2020 10:15) (95% - 99%)

## 2020-03-24 NOTE — H&P ADULT - ASSESSMENT
64y/o male w/ pmhx of L kidney transplant 2014, polycystic kidney disease, HTN, hx of spinal stenosis presenting with complaints of acute on chronic upper back pain, fever now for one week, found to have mild transaminitis, sepsis 2/2 uti, and zelda.

## 2020-03-24 NOTE — CONSULT NOTE ADULT - SUBJECTIVE AND OBJECTIVE BOX
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   Baseline creatinine of 1.3mg/dL. He is followed by Dr. Navas      Presents with worsening back pain,  weakness, fever of 101 at home.  He has no cough, no shortness of breath, no NVD. He has no dysuria or hematuria.  No known sick contacts.     He has known spinal stenosis with chronic back pain that has been worsening over the past few months. The pain is in his upper mid back. For the past 2-3 weeks he has been alternating Tylenol upto 6 tabs per and aleve upto 4 times per day. He has also tried gabapentin and lidocaine patch with no relief.  He has been eating and drinking very little due to his back pain and has lost > 10lbs . He was seen in ED on March 8 with a new diagnosis of DVT and started on Eliquis.   He also has HTN that remains controlled on Nifedipine.         PAST HISTORY  --------------------------------------------------------------------------------  PAST MEDICAL & SURGICAL HISTORY:  H/O secondary hyperparathyroidism  CKD (chronic kidney disease)  Lipoma  Gout  Hypertension  H/O polycystic kidney disease, autosomal dominant  H/O shoulder surgery: labrum tear  H/O umbilical hernia repair  S/P kidney transplant: 2014    FAMILY HISTORY:  Family history of polycystic kidney disease, autosomal dominant    PAST SOCIAL HISTORY:  no smoking or alcohol,      ALLERGIES & MEDICATIONS  --------------------------------------------------------------------------------  Allergies    No Known Allergies    Standing Inpatient Medications  influenza   Vaccine 0.5 milliLiter(s) IntraMuscular once  mycophenolate mofetil 500 milliGRAM(s) Oral once  oxyCODONE    IR 5 milliGRAM(s) Oral once  tacrolimus 1 milliGRAM(s) Oral once    PRN Inpatient Medications      REVIEW OF SYSTEMS  --------------------------------------------------------------------------------  Gen: No weight changes, fatigue, fevers/chills, weakness  Skin: No rashes  Head/Eyes/Ears/Mouth: No headache; Normal hearing; Normal vision w/o blurriness; No sinus pain/discomfort, sore throat  Respiratory: No dyspnea, cough, wheezing, hemoptysis  CV: No chest pain, PND, orthopnea  GI: No abdominal pain, diarrhea, constipation, nausea, vomiting, melena, hematochezia  : No increased frequency, dysuria, hematuria, nocturia  MSK: upper mid back pain. No leg pain  Neuro: No dizziness/lightheadedness, weakness, seizures, numbness, tingling  Heme: No easy bruising or bleeding  Endo: No heat/cold intolerance  Psych: No significant nervousness, anxiety, stress, depression    All other systems were reviewed and are negative, except as noted.    VITALS/PHYSICAL EXAM  --------------------------------------------------------------------------------  T(C): 36.9 (03-24-20 @ 10:15), Max: 38.3 (03-23-20 @ 18:59)  HR: 66 (03-24-20 @ 10:15) (60 - 96)  BP: 129/74 (03-24-20 @ 10:15) (116/80 - 146/76)  RR: 18 (03-24-20 @ 10:15) (16 - 20)  SpO2: 96% (03-24-20 @ 10:15) (95% - 99%)  Wt(kg): --  Height (cm): 177.8 (03-23-20 @ 18:59)  Weight (kg): 71.7 (03-23-20 @ 18:59)  BMI (kg/m2): 22.7 (03-23-20 @ 18:59)  BSA (m2): 1.89 (03-23-20 @ 18:59)      03-24-20 @ 07:01  -  03-24-20 @ 13:48  --------------------------------------------------------  IN: 0 mL / OUT: 500 mL / NET: -500 mL      Physical Exam:  	Gen: NAD, well-appearing, no respiratory disress   	HEENT: PERRL, supple neck, clear oropharynx  	Pulm: CTA B/L  	CV: RRR, S1S2; no rub  	Abd: +BS, soft, nontender/nondistended. Native kidneys not palpable. No CVA tenderness.                       Transplant: RLQ no tenderness or bruit   	: No suprapubic tenderness  	UE: Warm, FROM, no clubbing, intact strength; no edema; no asterixis  	LE: Warm, FROM, no clubbing, intact strength; no edema  	Neuro: No focal deficits, intact gait  	Psych: Normal affect and mood  	Skin: Warm, without rashes      LABS/STUDIES  --------------------------------------------------------------------------------              13.6   17.64 >-----------<  241      [03-23-20 @ 23:31]              42.6     133  |  97  |  48  ----------------------------<  164      [03-23-20 @ 23:31]  4.9   |  18  |  2.25        Ca     9.5     [03-23-20 @ 23:31]    TPro  7.3  /  Alb  3.5  /  TBili  1.5  /  DBili  x   /  AST  65  /  ALT  59  /  AlkPhos  425  [03-23-20 @ 23:31]    PT/INR: PT 16.4 , INR 1.42       [03-23-20 @ 23:31]  PTT: 27.5       [03-23-20 @ 23:31]      Creatinine Trend:  SCr 2.25 [03-23 @ 23:31]  SCr 1.39 [03-08 @ 14:53]    Urinalysis - [03-23-20 @ 23:55]      Color Dark Yellow / Appearance Slightly Turbid / SG 1.031 / pH 5.5      Gluc Negative / Ketone Negative  / Bili Small / Urobili 4 mg/dL       Blood Negative / Protein 30 mg/dL / Leuk Est Moderate / Nitrite Negative      RBC 3 / WBC 12 / Hyaline 7 / Gran  / Sq Epi  / Non Sq Epi 1 / Bacteria Negative [facility] DIVISION OF KIDNEY DISEASES AND HYPERTENSION -- INITIAL CONSULT NOTE  --------------------------------------------------------------------------------  HPI:  65 yr old man with ESRD due to ADPKD s/p pre emptive LRD donor kidney transplant from his brother in 2014 at [place].   Baseline creatinine of 1.3mg/dL. He is followed by Dr. Navas      Presents with worsening back pain,  weakness, fever of 101 at home.  He has no cough, no shortness of breath, no NVD. He has no dysuria or hematuria.  No known sick contacts.     He has known spinal stenosis with chronic back pain that has been worsening over the past few months. The pain is in his upper mid back. For the past 2-3 weeks he has been alternating Tylenol upto 6 tabs per and aleve upto 4 times per day. He has also tried gabapentin and lidocaine patch with no relief.  He has been eating and drinking very little due to his back pain and has lost > 10lbs . He was seen in ED on March 8 with a new diagnosis of DVT and started on Eliquis.   He also has HTN that remains controlled on Nifedipine.         PAST HISTORY  --------------------------------------------------------------------------------  PAST MEDICAL & SURGICAL HISTORY:  H/O secondary hyperparathyroidism  CKD (chronic kidney disease)  Lipoma  Gout  Hypertension  H/O polycystic kidney disease, autosomal dominant  H/O shoulder surgery: labrum tear  H/O umbilical hernia repair  S/P kidney transplant: 2014    FAMILY HISTORY:  Family history of polycystic kidney disease, autosomal dominant    PAST SOCIAL HISTORY:  no smoking or alcohol,      ALLERGIES & MEDICATIONS  --------------------------------------------------------------------------------  Allergies    No Known Allergies    Standing Inpatient Medications  influenza   Vaccine 0.5 milliLiter(s) IntraMuscular once  mycophenolate mofetil 500 milliGRAM(s) Oral once  oxyCODONE    IR 5 milliGRAM(s) Oral once  tacrolimus 1 milliGRAM(s) Oral once    PRN Inpatient Medications      REVIEW OF SYSTEMS  --------------------------------------------------------------------------------  Gen: No weight changes, fatigue, fevers/chills, weakness  Skin: No rashes  Head/Eyes/Ears/Mouth: No headache; Normal hearing; Normal vision w/o blurriness; No sinus pain/discomfort, sore throat  Respiratory: No dyspnea, cough, wheezing, hemoptysis  CV: No chest pain, PND, orthopnea  GI: No abdominal pain, diarrhea, constipation, nausea, vomiting, melena, hematochezia  : No increased frequency, dysuria, hematuria, nocturia  MSK: upper mid back pain. No leg pain  Neuro: No dizziness/lightheadedness, weakness, seizures, numbness, tingling  Heme: No easy bruising or bleeding  Endo: No heat/cold intolerance  Psych: No significant nervousness, anxiety, stress, depression    All other systems were reviewed and are negative, except as noted.    VITALS/PHYSICAL EXAM  --------------------------------------------------------------------------------  T(C): 36.9 (03-24-20 @ 10:15), Max: 38.3 (03-23-20 @ 18:59)  HR: 66 (03-24-20 @ 10:15) (60 - 96)  BP: 129/74 (03-24-20 @ 10:15) (116/80 - 146/76)  RR: 18 (03-24-20 @ 10:15) (16 - 20)  SpO2: 96% (03-24-20 @ 10:15) (95% - 99%)  Height (cm): 177.8 (03-23-20 @ 18:59)  Weight (kg): 71.7 (03-23-20 @ 18:59)  BMI (kg/m2): 22.7 (03-23-20 @ 18:59)  BSA (m2): 1.89 (03-23-20 @ 18:59)      03-24-20 @ 07:01  -  03-24-20 @ 13:48  --------------------------------------------------------  IN: 0 mL / OUT: 500 mL / NET: -500 mL      Physical Exam:  	Gen: NAD, well-appearing, no respiratory disress   	HEENT: PERRL, supple neck, clear oropharynx  	Pulm: CTA B/L  	CV: RRR, S1S2; no rub  	Abd: +BS, soft, nontender/nondistended. Native kidneys not palpable. No CVA tenderness.                       Transplant: RLQ no tenderness or bruit   	: No suprapubic tenderness  	UE: Warm, FROM, no clubbing, intact strength; no edema; no asterixis  	LE: Warm, FROM, no clubbing, intact strength; no edema  	Neuro: No focal deficits, intact gait  	Psych: Normal affect and mood  	Skin: Warm, without rashes      LABS/STUDIES  --------------------------------------------------------------------------------              13.6   17.64 >-----------<  241      [03-23-20 @ 23:31]              42.6     133  |  97  |  48  ----------------------------<  164      [03-23-20 @ 23:31]  4.9   |  18  |  2.25        Ca     9.5     [03-23-20 @ 23:31]    TPro  7.3  /  Alb  3.5  /  TBili  1.5  /  DBili  x   /  AST  65  /  ALT  59  /  AlkPhos  425  [03-23-20 @ 23:31]    PT/INR: PT 16.4 , INR 1.42       [03-23-20 @ 23:31]  PTT: 27.5       [03-23-20 @ 23:31]      Creatinine Trend:  SCr 2.25 [03-23 @ 23:31]  SCr 1.39 [03-08 @ 14:53]    Urinalysis - [03-23-20 @ 23:55]      Color Dark Yellow / Appearance Slightly Turbid / SG 1.031 / pH 5.5      Gluc Negative / Ketone Negative  / Bili Small / Urobili 4 mg/dL       Blood Negative / Protein 30 mg/dL / Leuk Est Moderate / Nitrite Negative      RBC 3 / WBC 12 / Hyaline 7 / Gran  / Sq Epi  / Non Sq Epi 1 / Bacteria Negative

## 2020-03-24 NOTE — ED ADULT NURSE REASSESSMENT NOTE - NS ED NURSE REASSESS COMMENT FT1
66 y/o male aao3 , airway clear and patent, speaking in clear and full sentences, equal chest expansion with strong radial pulses, skin normal, appropriate for ethnicity, warm and intact. full ROM, steady ambulatory gait. no signs of distress. admitted, awaiting for an inpatient bed. x2 piv intact w/o issue. does have some slight pain to back, chronic in nature, 5/10

## 2020-03-24 NOTE — CONSULT NOTE ADULT - ASSESSMENT
65 yr old man with kidney transplant in 2014 on immunosuppression, HTN, DVT on Eliquis and chronic back pain due to spinal stenosis, presenting with worsening back pain and new onset fever and generalized weakness, found to have fever, leukocytosis, pyuria concerning for pyelonephritis, CRYSTAL and Transaminitis. COVID negative.     1. Kidney transplant in 2014 on immunosuppression with tacrolimus 1mg AM, 2mg PM, Cellcept 500mg po bid and prednisone 5mg daily.      Continue home dose immunosuppression. Obtain tacrolimus trough level in AM 30 minutes prior to dose.     2. CRYSTAL likely due to volume depletion/poor po intake +NSAIDs induced CRYSTAL.       Avoid NSAIDs. Normal saline 100mlhour x 1 liter       Obtain transplant renal US and bladder ultrasound r/o hydronephrosis         3. Transaminitis/elevated Alkaline phosphatase  - likely due to excess acetaminophen use.       Obtain RUQ ultrasound to r/o hepatobiliary etiology       Obtain Serum CMV PCR and hepatitis panel       Avoid Tylenol. Can try Tramadol for pain     4. Fever and abnormal UA likey UTI/Pyelonephritis        Obtain urine culture        Ceftriaxone 1gm po daily    5. Hypertension - BP not elevated at this time. Can hold nifedipine and resume if BP > 140 systolic       Mersema Nabila   Cell # [phone] 65 yr old man with kidney transplant in 2014 on immunosuppression, HTN, DVT on Eliquis and chronic back pain due to spinal stenosis, presenting with worsening back pain and new onset fever and generalized weakness, found to have fever, leukocytosis, pyuria concerning for pyelonephritis, CRYSTAL and Transaminitis. COVID negative.     1. Kidney transplant in 2014 on immunosuppression with tacrolimus 1mg AM, 2mg PM, Cellcept 500mg po bid      Continue home dose immunosuppression. Obtain tacrolimus trough level in AM 30 minutes prior to dose.     2. CRYSTAL likely due to volume depletion/poor po intake +NSAIDs induced CRYSTAL.       Avoid NSAIDs. Normal saline 100mlhour x 1 liter       Obtain transplant renal US and bladder ultrasound r/o hydronephrosis         3. Transaminitis/elevated Alkaline phosphatase  - likely due to excess acetaminophen use.       Obtain RUQ ultrasound to r/o hepatobiliary etiology       Obtain Serum CMV PCR and hepatitis panel       Avoid Tylenol. Can try Tramadol for pain     4. Fever and abnormal UA likey UTI/Pyelonephritis        Obtain urine culture        Ceftriaxone 1gm po daily    5. Hypertension - BP not elevated at this time. Can hold nifedipine and resume if BP > 140 systolic       Mersema Nabila   Cell # [phone]

## 2020-03-24 NOTE — H&P ADULT - NSHPLABSRESULTS_GEN_ALL_CORE
Personally reviewed labs, imaging      133<L>  |  97  |  48<H>  ----------------------------<  164<H>  4.9   |  18<L>  |  2.25<H>    Ca    9.5      23 Mar 2020 23:31    TPro  7.3  /  Alb  3.5  /  TBili  1.5<H>  /  DBili  x   /  AST  65<H>  /  ALT  59<H>  /  AlkPhos  425<H>        PT/INR - ( 23 Mar 2020 23:31 )   PT: 16.4 sec;   INR: 1.42 ratio         PTT - ( 23 Mar 2020 23:31 )  PTT:27.5 sec              Urinalysis Basic - ( 23 Mar 2020 23:55 )    Color: Dark Yellow / Appearance: Slightly Turbid / S.031 / pH: x  Gluc: x / Ketone: Negative  / Bili: Small / Urobili: 4 mg/dL   Blood: x / Protein: 30 mg/dL / Nitrite: Negative   Leuk Esterase: Moderate / RBC: 3 /hpf / WBC 12 /HPF   Sq Epi: x / Non Sq Epi: 1 /hpf / Bacteria: Negative                              13.6   17.64 )-----------( 241      ( 23 Mar 2020 23:31 )             42.6     CAPILLARY BLOOD GLUCOSE        Blood Gas Source Venous: Venous ( @ 23:31)  < from: Xray Chest 1 View AP/PA (20 @ 23:32) >    FINDINGS:     The lungs are clear.  Heart size is normal.  No acute osseous abnormality.     Personally reviewed imaging and ekg.   IMPRESSION:     Clear lungs.    < end of copied text >      EKG: NSR, HR 84, QRS 86,

## 2020-03-25 LAB
ALBUMIN SERPL ELPH-MCNC: 3.1 G/DL — LOW (ref 3.3–5)
ALP SERPL-CCNC: 362 U/L — HIGH (ref 40–120)
ALT FLD-CCNC: 43 U/L — SIGNIFICANT CHANGE UP (ref 10–45)
ANION GAP SERPL CALC-SCNC: 12 MMOL/L — SIGNIFICANT CHANGE UP (ref 5–17)
AST SERPL-CCNC: 47 U/L — HIGH (ref 10–40)
BASOPHILS # BLD AUTO: 0.03 K/UL — SIGNIFICANT CHANGE UP (ref 0–0.2)
BASOPHILS NFR BLD AUTO: 0.2 % — SIGNIFICANT CHANGE UP (ref 0–2)
BILIRUB SERPL-MCNC: 1.5 MG/DL — HIGH (ref 0.2–1.2)
BUN SERPL-MCNC: 36 MG/DL — HIGH (ref 7–23)
CALCIUM SERPL-MCNC: 9.2 MG/DL — SIGNIFICANT CHANGE UP (ref 8.4–10.5)
CHLORIDE SERPL-SCNC: 100 MMOL/L — SIGNIFICANT CHANGE UP (ref 96–108)
CO2 SERPL-SCNC: 22 MMOL/L — SIGNIFICANT CHANGE UP (ref 22–31)
CREAT SERPL-MCNC: 1.62 MG/DL — HIGH (ref 0.5–1.3)
CULTURE RESULTS: SIGNIFICANT CHANGE UP
EOSINOPHIL # BLD AUTO: 0.24 K/UL — SIGNIFICANT CHANGE UP (ref 0–0.5)
EOSINOPHIL NFR BLD AUTO: 1.9 % — SIGNIFICANT CHANGE UP (ref 0–6)
GGT SERPL-CCNC: 369 U/L — HIGH (ref 9–50)
GLUCOSE SERPL-MCNC: 133 MG/DL — HIGH (ref 70–99)
HAV IGM SER-ACNC: SIGNIFICANT CHANGE UP
HBV CORE IGM SER-ACNC: SIGNIFICANT CHANGE UP
HBV SURFACE AG SER-ACNC: SIGNIFICANT CHANGE UP
HCT VFR BLD CALC: 41.2 % — SIGNIFICANT CHANGE UP (ref 39–50)
HCV AB S/CO SERPL IA: 0.1 S/CO — SIGNIFICANT CHANGE UP (ref 0–0.99)
HCV AB SERPL-IMP: SIGNIFICANT CHANGE UP
HGB BLD-MCNC: 13.1 G/DL — SIGNIFICANT CHANGE UP (ref 13–17)
IMM GRANULOCYTES NFR BLD AUTO: 0.5 % — SIGNIFICANT CHANGE UP (ref 0–1.5)
INR BLD: 1.54 RATIO — HIGH (ref 0.88–1.16)
LYMPHOCYTES # BLD AUTO: 1.11 K/UL — SIGNIFICANT CHANGE UP (ref 1–3.3)
LYMPHOCYTES # BLD AUTO: 8.9 % — LOW (ref 13–44)
MAGNESIUM SERPL-MCNC: 2.1 MG/DL — SIGNIFICANT CHANGE UP (ref 1.6–2.6)
MCHC RBC-ENTMCNC: 26.2 PG — LOW (ref 27–34)
MCHC RBC-ENTMCNC: 31.8 GM/DL — LOW (ref 32–36)
MCV RBC AUTO: 82.4 FL — SIGNIFICANT CHANGE UP (ref 80–100)
MONOCYTES # BLD AUTO: 1.34 K/UL — HIGH (ref 0–0.9)
MONOCYTES NFR BLD AUTO: 10.7 % — SIGNIFICANT CHANGE UP (ref 2–14)
NEUTROPHILS # BLD AUTO: 9.7 K/UL — HIGH (ref 1.8–7.4)
NEUTROPHILS NFR BLD AUTO: 77.8 % — HIGH (ref 43–77)
NRBC # BLD: 0 /100 WBCS — SIGNIFICANT CHANGE UP (ref 0–0)
PHOSPHATE SERPL-MCNC: 2.5 MG/DL — SIGNIFICANT CHANGE UP (ref 2.5–4.5)
PLATELET # BLD AUTO: 169 K/UL — SIGNIFICANT CHANGE UP (ref 150–400)
POTASSIUM SERPL-MCNC: 4.9 MMOL/L — SIGNIFICANT CHANGE UP (ref 3.5–5.3)
POTASSIUM SERPL-SCNC: 4.9 MMOL/L — SIGNIFICANT CHANGE UP (ref 3.5–5.3)
PROT SERPL-MCNC: 6.6 G/DL — SIGNIFICANT CHANGE UP (ref 6–8.3)
PROTHROM AB SERPL-ACNC: 18 SEC — HIGH (ref 10–13.1)
RBC # BLD: 5 M/UL — SIGNIFICANT CHANGE UP (ref 4.2–5.8)
RBC # FLD: 14 % — SIGNIFICANT CHANGE UP (ref 10.3–14.5)
SODIUM SERPL-SCNC: 134 MMOL/L — LOW (ref 135–145)
SPECIMEN SOURCE: SIGNIFICANT CHANGE UP
TACROLIMUS SERPL-MCNC: 6.5 NG/ML — SIGNIFICANT CHANGE UP
WBC # BLD: 12.48 K/UL — HIGH (ref 3.8–10.5)
WBC # FLD AUTO: 12.48 K/UL — HIGH (ref 3.8–10.5)

## 2020-03-25 PROCEDURE — 71250 CT THORAX DX C-: CPT | Mod: 26

## 2020-03-25 PROCEDURE — 74182 MRI ABDOMEN W/CONTRAST: CPT | Mod: 26

## 2020-03-25 PROCEDURE — 99221 1ST HOSP IP/OBS SF/LOW 40: CPT

## 2020-03-25 PROCEDURE — 93970 EXTREMITY STUDY: CPT | Mod: 26

## 2020-03-25 PROCEDURE — 99232 SBSQ HOSP IP/OBS MODERATE 35: CPT

## 2020-03-25 PROCEDURE — 76776 US EXAM K TRANSPL W/DOPPLER: CPT | Mod: 26,RT

## 2020-03-25 RX ORDER — HEPARIN SODIUM 5000 [USP'U]/ML
5000 INJECTION INTRAVENOUS; SUBCUTANEOUS EVERY 12 HOURS
Refills: 0 | Status: DISCONTINUED | OUTPATIENT
Start: 2020-03-26 | End: 2020-03-26

## 2020-03-25 RX ORDER — OXYCODONE HYDROCHLORIDE 5 MG/1
5 TABLET ORAL EVERY 6 HOURS
Refills: 0 | Status: DISCONTINUED | OUTPATIENT
Start: 2020-03-25 | End: 2020-03-26

## 2020-03-25 RX ORDER — POLYETHYLENE GLYCOL 3350 17 G/17G
17 POWDER, FOR SOLUTION ORAL DAILY
Refills: 0 | Status: DISCONTINUED | OUTPATIENT
Start: 2020-03-25 | End: 2020-03-31

## 2020-03-25 RX ADMIN — POLYETHYLENE GLYCOL 3350 17 GRAM(S): 17 POWDER, FOR SOLUTION ORAL at 16:52

## 2020-03-25 RX ADMIN — HYDROMORPHONE HYDROCHLORIDE 0.5 MILLIGRAM(S): 2 INJECTION INTRAMUSCULAR; INTRAVENOUS; SUBCUTANEOUS at 04:15

## 2020-03-25 RX ADMIN — OXYCODONE HYDROCHLORIDE 5 MILLIGRAM(S): 5 TABLET ORAL at 21:15

## 2020-03-25 RX ADMIN — APIXABAN 5 MILLIGRAM(S): 2.5 TABLET, FILM COATED ORAL at 05:04

## 2020-03-25 RX ADMIN — CEFTRIAXONE 100 MILLIGRAM(S): 500 INJECTION, POWDER, FOR SOLUTION INTRAMUSCULAR; INTRAVENOUS at 16:49

## 2020-03-25 RX ADMIN — OXYCODONE HYDROCHLORIDE 5 MILLIGRAM(S): 5 TABLET ORAL at 15:45

## 2020-03-25 RX ADMIN — OXYCODONE HYDROCHLORIDE 5 MILLIGRAM(S): 5 TABLET ORAL at 20:47

## 2020-03-25 RX ADMIN — MYCOPHENOLATE MOFETIL 500 MILLIGRAM(S): 250 CAPSULE ORAL at 08:34

## 2020-03-25 RX ADMIN — HYDROMORPHONE HYDROCHLORIDE 0.5 MILLIGRAM(S): 2 INJECTION INTRAMUSCULAR; INTRAVENOUS; SUBCUTANEOUS at 03:48

## 2020-03-25 RX ADMIN — OXYCODONE HYDROCHLORIDE 5 MILLIGRAM(S): 5 TABLET ORAL at 14:48

## 2020-03-25 RX ADMIN — TACROLIMUS 1 MILLIGRAM(S): 5 CAPSULE ORAL at 08:34

## 2020-03-25 RX ADMIN — HYDROMORPHONE HYDROCHLORIDE 0.5 MILLIGRAM(S): 2 INJECTION INTRAMUSCULAR; INTRAVENOUS; SUBCUTANEOUS at 10:13

## 2020-03-25 RX ADMIN — HYDROMORPHONE HYDROCHLORIDE 0.5 MILLIGRAM(S): 2 INJECTION INTRAMUSCULAR; INTRAVENOUS; SUBCUTANEOUS at 10:30

## 2020-03-25 RX ADMIN — HYDROMORPHONE HYDROCHLORIDE 0.5 MILLIGRAM(S): 2 INJECTION INTRAMUSCULAR; INTRAVENOUS; SUBCUTANEOUS at 16:55

## 2020-03-25 RX ADMIN — TACROLIMUS 2 MILLIGRAM(S): 5 CAPSULE ORAL at 20:47

## 2020-03-25 NOTE — PROGRESS NOTE ADULT - SUBJECTIVE AND OBJECTIVE BOX
[facility] DIVISION OF KIDNEY DISEASES AND HYPERTENSION -- FOLLOW UP NOTE  --------------------------------------------------------------------------------  65 yr old man with ESRD due to ADPKD s/p pre emptive LRD donor kidney transplant from his brother in 2014 at [place].   Baseline creatinine of 1.3mg/dL. He is followed by Dr. Navas     PMH includes spinal stenosis, HTN. He had acute LLE DVT 3 weeks ago and started on Eliquis.   Presents with worsening back pain,  weakness, fever of 101 at home.  Pain has been going on for at least 3 months and worsened over the past few weeks unresponsive to Tylenol and aleve   He also has weight loss > 10lbs.     CT scan done yesterday showed numerous liver leisons  and a pancreatic lesion suspicious for metastatic cancer.     24 hour events/subjective:  Complains of severe back pain.   Tmax 101.4 yesterday evening, afebrile this AM. 's systolic.   uop 1.6 liters       Standing Inpatient Medications  apixaban 5 milliGRAM(s) Oral two times a day  cefTRIAXone   IVPB 1000 milliGRAM(s) IV Intermittent every 24 hours  influenza   Vaccine 0.5 milliLiter(s) IntraMuscular once  mycophenolate mofetil 500 milliGRAM(s) Oral two times a day  polyethylene glycol 3350 17 Gram(s) Oral daily  tacrolimus 1 milliGRAM(s) Oral <User Schedule>  tacrolimus 2 milliGRAM(s) Oral at bedtime    PRN Inpatient Medications  acetaminophen   Tablet .. 650 milliGRAM(s) Oral every 6 hours PRN  HYDROmorphone  Injectable 0.5 milliGRAM(s) IV Push every 6 hours PRN  oxyCODONE    IR 5 milliGRAM(s) Oral every 6 hours PRN  traMADol 25 milliGRAM(s) Oral every 6 hours PRN        VITALS/PHYSICAL EXAM  --------------------------------------------------------------------------------  T(C): 37 (03-25-20 @ 05:54), Max: 38.6 (03-24-20 @ 19:51)  HR: 61 (03-25-20 @ 05:54) (61 - 68)  BP: 127/64 (03-25-20 @ 05:54) (127/64 - 129/74)  RR: 18 (03-25-20 @ 05:54) (18 - 18)  SpO2: 95% (03-25-20 @ 05:54) (95% - 96%)    Height (cm): 177.8 (03-23-20 @ 18:59)  Weight (kg): 71.7 (03-23-20 @ 18:59)  BMI (kg/m2): 22.7 (03-23-20 @ 18:59)  BSA (m2): 1.89 (03-23-20 @ 18:59)      03-24-20 @ 07:01  -  03-25-20 @ 07:00  --------------------------------------------------------  IN: 150 mL / OUT: 1600 mL / NET: -1450 mL      Physical Exam:  Appears to be in pain, grimacing. no respiratory distress  lungs clear  regular S1 and s2,   abdomen soft and non tender   no extremity edema   Alert and oriented x 3       LABS/STUDIES  --------------------------------------------------------------------------------              13.1   12.48 >-----------<  169      [03-25-20 @ 08:23]              41.2     134  |  100  |  36  ----------------------------<  133      [03-25-20 @ 08:23]  4.9   |  22  |  1.62        Ca     9.2     [03-25-20 @ 08:23]      Mg     2.1     [03-25-20 @ 08:23]      Phos  2.5     [03-25-20 @ 08:23]    TPro  6.6  /  Alb  3.1  /  TBili  1.5  /  DBili  x   /  AST  47  /  ALT  43  /  AlkPhos  362  [03-25-20 @ 08:23]    PT/INR: PT 16.4 , INR 1.42       [03-23-20 @ 23:31]  PTT: 27.5       [03-23-20 @ 23:31]      Creatinine Trend:  SCr 1.62 [03-25 @ 08:23]  SCr 1.74 [03-24 @ 15:41]  SCr 2.25 [03-23 @ 23:31]  SCr 1.39 [03-08 @ 14:53]      Urinalysis - [03-23-20 @ 23:55]      Color Dark Yellow / Appearance Slightly Turbid / SG 1.031 / pH 5.5      Gluc Negative / Ketone Negative  / Bili Small / Urobili 4 mg/dL       Blood Negative / Protein 30 mg/dL / Leuk Est Moderate / Nitrite Negative      RBC 3 / WBC 12 / Hyaline 7 / Gran  / Sq Epi  / Non Sq Epi 1 / Bacteria Negative

## 2020-03-25 NOTE — PROGRESS NOTE ADULT - PROBLEM SELECTOR PLAN 3
-Patient with upper back pain, R>L currently, acute on chronic. Differential includes: infectious etiology (pyelo vs cyst infection) vs referred pain from possibly malignancy (given weight loss and transaminitis) vs musculoskeletal given heavy lifting at job  -will obtain CT abdomen, renal u/s, CMV pcr, hep panel  -pain control w/ tramadol for moderate and dilaudid for severe (avoid tylenol and oxy given zelda) likely secondary to metastatic pancreatic cancer  will continue Oxy IR as needed

## 2020-03-25 NOTE — PROGRESS NOTE ADULT - PROBLEM SELECTOR PLAN 1
-Patient w. leukocytosis, fever, and U/A with +LE, WBC, will treat for UTI.  -patient does have back pain, more upper back pain, not flank pain, which would be more typical of pyelo  -will f/u Bcx, UCx, continue with ceftriaxone blood cultures and urine culture negative  will continue ceftriaxone IV for now however as he is immune suppressed   CT chest non-con to r/o pneumonia

## 2020-03-25 NOTE — PROGRESS NOTE ADULT - SUBJECTIVE AND OBJECTIVE BOX
Patient is a 65y old  Male who presents with a chief complaint of fever, back pain (25 Mar 2020 10:00)  patient not known to me, assigned this AM to assume care  chart reviewed and events thus far noted  admitted overnight by full time hospitalist service     SUBJECTIVE / OVERNIGHT EVENTS: overnight events noted    ROS:  Resp: No cough no sputum production  CVS: No chest pain no palpitations no orthopnea  GI: no N/V/D  : no dysuria, no hematuria  Neuro: no weakness no paresthesias  Heme: No petechiae no easy bruising  Msk: No joint pain no swelling  Skin: No rash no itching        MEDICATIONS  (STANDING):  cefTRIAXone   IVPB 1000 milliGRAM(s) IV Intermittent every 24 hours  influenza   Vaccine 0.5 milliLiter(s) IntraMuscular once  mycophenolate mofetil 500 milliGRAM(s) Oral two times a day  polyethylene glycol 3350 17 Gram(s) Oral daily  tacrolimus 1 milliGRAM(s) Oral <User Schedule>  tacrolimus 2 milliGRAM(s) Oral at bedtime    MEDICATIONS  (PRN):  acetaminophen   Tablet .. 650 milliGRAM(s) Oral every 6 hours PRN Temp greater or equal to 38C (100.4F), Mild Pain (1 - 3)  HYDROmorphone  Injectable 0.5 milliGRAM(s) IV Push every 6 hours PRN Severe Pain (7 - 10)  oxyCODONE    IR 5 milliGRAM(s) Oral every 6 hours PRN Moderate Pain (4 - 6)        CAPILLARY BLOOD GLUCOSE        I&O's Summary    24 Mar 2020 07:01  -  25 Mar 2020 07:00  --------------------------------------------------------  IN: 150 mL / OUT: 1600 mL / NET: -1450 mL        Vital Signs Last 24 Hrs  T(C): 36.7 (25 Mar 2020 11:37), Max: 38.6 (24 Mar 2020 19:51)  T(F): 98.1 (25 Mar 2020 11:37), Max: 101.4 (24 Mar 2020 19:51)  HR: 86 (25 Mar 2020 11:37) (61 - 86)  BP: 113/72 (25 Mar 2020 11:37) (113/72 - 128/71)  BP(mean): --  RR: 18 (25 Mar 2020 11:37) (18 - 18)  SpO2: 97% (25 Mar 2020 11:37) (95% - 97%)    PHYSICAL EXAM:  GENERAL: in no apparent distress  asthenic  HEAD:  Atraumatic, Normocephalic  EYES: EOMI, PERRLA, conjunctiva and sclera clear  NECK: Supple, No JVD  CHEST/LUNG: no wheeze, clear to auscultation bilaterally  HEART: S1 S2; No rubs or gallops, no murmurs appreciated  ABDOMEN: Soft, Nontender, Nondistended; Bowel sounds present  EXTREMITIES:  No clubbing or cyanosis, + Peripheral Pulses,  no edema  PSYCH: AO x 3 appropriate affect  NEUROLOGY: non-focal, motor and sensory systems intact  SKIN: No rashes or lesions    LABS:                        13.1   12.48 )-----------( 169      ( 25 Mar 2020 08:23 )             41.2     03-25    134<L>  |  100  |  36<H>  ----------------------------<  133<H>  4.9   |  22  |  1.62<H>    Ca    9.2      25 Mar 2020 08:23  Phos  2.5     03-25  Mg     2.1     03-25    TPro  6.6  /  Alb  3.1<L>  /  TBili  1.5<H>  /  DBili  x   /  AST  47<H>  /  ALT  43  /  AlkPhos  362<H>  03-25    PT/INR - ( 25 Mar 2020 10:03 )   PT: 18.0 sec;   INR: 1.54 ratio         PTT - ( 23 Mar 2020 23:31 )  PTT:27.5 sec      Urinalysis Basic - ( 23 Mar 2020 23:55 )    Color: Dark Yellow / Appearance: Slightly Turbid / S.031 / pH: x  Gluc: x / Ketone: Negative  / Bili: Small / Urobili: 4 mg/dL   Blood: x / Protein: 30 mg/dL / Nitrite: Negative   Leuk Esterase: Moderate / RBC: 3 /hpf / WBC 12 /HPF   Sq Epi: x / Non Sq Epi: 1 /hpf / Bacteria: Negative          All consultant(s) notes reviewed and care discussed with other providers        Contact Number, Dr Johnson [phone]

## 2020-03-25 NOTE — PROGRESS NOTE ADULT - ATTENDING COMMENTS
discussed with patient in detail, expresses understanding of treatment plans.  discussed with ACP on the floor

## 2020-03-25 NOTE — CONSULT NOTE ADULT - SUBJECTIVE AND OBJECTIVE BOX
Chief Complaint:  Patient is a 65y old  Male who presents with a chief complaint of fever, back pain (24 Mar 2020 14:05)      HPI:  65 year old male with polycystic kidney disease s/p Left kidney transplant , HTN, history of spinal stenosis, recent LLE DVT (3/2020, on Eliquis) presenting with complaints of acute on chronic upper back pain now for one week.     As per the patient, he has had back pain, for 5 months, which has become progressively worse this past week, and with associated fevers of 101 at home. This back pain is sharp, 6-7/10 pain at its worst, does not get worse with movement. He denies loss of sensation, fecal/urine incontinence, numbness/tingling. He states he has some intermittent SOB at times and also notes loss of appetite, weight loss of 25 pounds in the last 2.5 months. He states for the back pain, he has been seeing orthopedics, has received steroid injections with no relief. Now, he has been taking aleve and tylenol around the clock, very frequently this past week. He denies CP, palpitations, abdominal pain, changes in bowel habits. He denies dysuria, hematuria.     Patient had a CT abdomen which showed a pancreas mass with multiple liver lesions and thus GI is consulted for further evaluation.       Allergies:  No Known Allergies      Home Medications:  Patient Currently Takes Medications as of 24-Mar-2020 09:02 documented in Structured Notes  · 	Eliquis 5 mg oral tablet: Last Dose Taken:  , 1 tab(s) orally 2 times a day   · 	allopurinol 100 mg oral tablet: Last Dose Taken:  , orally 2 times a day  · 	lidocaine 5% topical film: Last Dose Taken:  , Apply topically to affected area once a day (12 hours on, 12 hours off)  · 	Tylenol 500 mg oral tablet: 2 tab(s) orally every 6 hours, As Needed  · 	Aleve 220 mg oral tablet: Last Dose Taken:  , 1 tab(s) orally every 8 hours, As Needed  · 	mycophenolate mofetil 500 mg oral tablet: Last Dose Taken:  , 1 tab(s) orally 2 times a day  · 	tacrolimus 1 mg oral capsule: Last Dose Taken:  , 1 cap(s) orally 3 times a day (1 cap AM and 2 caps PM)  · 	NIFEdipine 60 mg oral tablet, extended release: Last Dose Taken:  , 1 tab(s) orally once a day am  · 	atorvastatin 10 mg oral tablet: Last Dose Taken:  , 1 tab(s) orally once a day (at bedtime)      Hospital Medications:  acetaminophen   Tablet .. 650 milliGRAM(s) Oral every 6 hours PRN  apixaban 5 milliGRAM(s) Oral two times a day  cefTRIAXone   IVPB 1000 milliGRAM(s) IV Intermittent every 24 hours  HYDROmorphone  Injectable 0.5 milliGRAM(s) IV Push every 6 hours PRN  influenza   Vaccine 0.5 milliLiter(s) IntraMuscular once  mycophenolate mofetil 500 milliGRAM(s) Oral two times a day  tacrolimus 1 milliGRAM(s) Oral <User Schedule>  tacrolimus 2 milliGRAM(s) Oral at bedtime  traMADol 25 milliGRAM(s) Oral every 6 hours PRN      PMHX/PSHX:  H/O secondary hyperparathyroidism  CKD (chronic kidney disease)  Lipoma  Gout  Hypertension  H/O polycystic kidney disease, autosomal dominant  H/O shoulder surgery  H/O umbilical hernia repair  S/P kidney transplant      Family history:  Family history of polycystic kidney disease, autosomal dominant      Social History:   Denies any toxic habits    ROS:     General:  No wt loss, fevers, chills, night sweats, fatigue,   Eyes:  Good vision, no reported pain  ENT:  No sore throat, pain, runny nose, dysphagia  CV:  No pain, palpitations, hypo/hypertension  Resp:  No dyspnea, cough, tachypnea, wheezing  GI:  See HPI  :  No pain, bleeding, incontinence, nocturia  Muscle:  No pain, weakness  Neuro:  No weakness, tingling, memory problems  Psych:  No fatigue, insomnia, mood problems, depression  Endocrine:  No polyuria, polydipsia, cold/heat intolerance  Heme:  No petechiae, ecchymosis, easy bruisability  Skin:  No rash, edema      PHYSICAL EXAM:     GENERAL:  Appears stated age  HEENT:  NC/AT  CHEST:  Full & symmetric excursion  HEART:  Regular rhythm, S1, S2  ABDOMEN:  Soft, non-tender, non-distended  EXTREMITIES:  no edema  SKIN:  No rash  NEURO:  Alert, oriented    Vital Signs:  Vital Signs Last 24 Hrs  T(C): 37 (25 Mar 2020 05:54), Max: 38.6 (24 Mar 2020 19:51)  T(F): 98.6 (25 Mar 2020 05:54), Max: 101.4 (24 Mar 2020 19:51)  HR: 61 (25 Mar 2020 05:54) (61 - 68)  BP: 127/64 (25 Mar 2020 05:54) (127/64 - 129/74)  BP(mean): --  RR: 18 (25 Mar 2020 05:54) (18 - 18)  SpO2: 95% (25 Mar 2020 05:54) (95% - 96%)  Daily     Daily     LABS:                        13.1   12.48 )-----------( 169      ( 25 Mar 2020 08:23 )             41.2     03-25    134<L>  |  100  |  36<H>  ----------------------------<  133<H>  4.9   |  22  |  1.62<H>    Ca    9.2      25 Mar 2020 08:23  Phos  2.5       Mg     2.1         TPro  6.6  /  Alb  3.1<L>  /  TBili  1.5<H>  /  DBili  x   /  AST  47<H>  /  ALT  43  /  AlkPhos  362<H>  25    LIVER FUNCTIONS - ( 25 Mar 2020 08:23 )  Alb: 3.1 g/dL / Pro: 6.6 g/dL / ALK PHOS: 362 U/L / ALT: 43 U/L / AST: 47 U/L / GGT: x           PT/INR - ( 23 Mar 2020 23:31 )   PT: 16.4 sec;   INR: 1.42 ratio         PTT - ( 23 Mar 2020 23:31 )  PTT:27.5 sec  Urinalysis Basic - ( 23 Mar 2020 23:55 )    Color: Dark Yellow / Appearance: Slightly Turbid / S.031 / pH: x  Gluc: x / Ketone: Negative  / Bili: Small / Urobili: 4 mg/dL   Blood: x / Protein: 30 mg/dL / Nitrite: Negative   Leuk Esterase: Moderate / RBC: 3 /hpf / WBC 12 /HPF   Sq Epi: x / Non Sq Epi: 1 /hpf / Bacteria: Negative      Imaging:    < from: CT Abdomen and Pelvis No Cont (20 @ 18:37) >  EXAM:  CT ABDOMEN AND PELVIS                        PROCEDURE DATE:  2020    INTERPRETATION:  CLINICAL INFORMATION: Polycystic kidney disease presenting with back pain, fever, weight loss. Evaluate for infection, malignancy.  COMPARISON: CT 2014.    PROCEDURE:   CT of the Abdomen and Pelvis was performed without intravenous contrast.   Intravenous contrast: None.  Oral contrast: None.  Sagittal and coronal reformats were performed.    FINDINGS: Evaluation of the solid organs and vasculature is limited in the absence of intravenous contrast.  LOWER CHEST: Linear bibasilar atelectasis.    LIVER: Innumerable ill-defined low-attenuation and isodense masses, new since , indeterminate without contrast but concerning for metastatic disease.  BILE DUCTS: Mild left-sided intrahepatic biliary ductal dilatation.  GALLBLADDER: Within normal limits.  SPLEEN: Within normal limits.  PANCREAS: Focal masslike thickening of the pancreatic body is suspected, without ductal dilatation.  ADRENALS: Within normal limits.  KIDNEYS/URETERS: Right lower quadrant renal transplant with 1.2 cm cortical cyst. No hydronephrosis. Prominent native kidneys with innumerable cysts, some with calcifications.  Subcentimeter renal lesions are too small to characterize.    BLADDER: Within normal limits.  REPRODUCTIVE ORGANS: Enlarged prostate.    BOWEL: Colonic diverticulosis. No bowel obstruction. Appendix is normal.  PERITONEUM: No ascites.  VESSELS: Atherosclerotic changes.  RETROPERITONEUM/LYMPH NODES: No lymphadenopathy.    ABDOMINAL WALL: Within normal limits.  BONES: Degenerative changes.    IMPRESSION:   Innumerable new hepatic lesions, indeterminate without contrast, but metastatic disease is a consideration. Infection is considered less likely in the differential. Mild left intrahepatic biliary ductal dilatation.  Possible pancreatic body mass.  Contrast-enhanced CT or MRI is recommended if feasible based on renal function.  I discussed the findings with DEBBIE Hadley 3/24/2020 at 7:15 PM.  < end of copied text > Chief Complaint:  Patient is a 65y old  Male who presents with a chief complaint of fever, back pain (24 Mar 2020 14:05)      HPI:  65 year old male with polycystic kidney disease s/p Left kidney transplant , HTN, history of spinal stenosis, recent LLE DVT (3/2020, on Eliquis) presenting with complaints of acute on chronic upper back pain now for one week.     As per the patient, he has had back pain, for 5 months, which has become progressively worse this past week, and with associated fevers of 101 at home. This back pain is sharp, 6-7/10 pain at its worst, does not get worse with movement. He denies loss of sensation, fecal/urine incontinence, numbness/tingling. He states he has some intermittent SOB at times and also notes loss of appetite, weight loss of 25 pounds in the last 2.5 months. He states for the back pain, he has been seeing orthopedics, has received steroid injections with no relief. Now, he has been taking aleve and tylenol around the clock, very frequently this past week. He denies CP, palpitations, abdominal pain, changes in bowel habits. He denies dysuria, hematuria.     Patient had a CT abdomen which showed a pancreas mass with multiple liver lesions and thus GI is consulted for further evaluation.       Allergies:  No Known Allergies      Home Medications:  Patient Currently Takes Medications as of 24-Mar-2020 09:02 documented in Structured Notes  · 	Eliquis 5 mg oral tablet: Last Dose Taken:  , 1 tab(s) orally 2 times a day   · 	allopurinol 100 mg oral tablet: Last Dose Taken:  , orally 2 times a day  · 	lidocaine 5% topical film: Last Dose Taken:  , Apply topically to affected area once a day (12 hours on, 12 hours off)  · 	Tylenol 500 mg oral tablet: 2 tab(s) orally every 6 hours, As Needed  · 	Aleve 220 mg oral tablet: Last Dose Taken:  , 1 tab(s) orally every 8 hours, As Needed  · 	mycophenolate mofetil 500 mg oral tablet: Last Dose Taken:  , 1 tab(s) orally 2 times a day  · 	tacrolimus 1 mg oral capsule: Last Dose Taken:  , 1 cap(s) orally 3 times a day (1 cap AM and 2 caps PM)  · 	NIFEdipine 60 mg oral tablet, extended release: Last Dose Taken:  , 1 tab(s) orally once a day am  · 	atorvastatin 10 mg oral tablet: Last Dose Taken:  , 1 tab(s) orally once a day (at bedtime)      Hospital Medications:  acetaminophen   Tablet .. 650 milliGRAM(s) Oral every 6 hours PRN  apixaban 5 milliGRAM(s) Oral two times a day  cefTRIAXone   IVPB 1000 milliGRAM(s) IV Intermittent every 24 hours  HYDROmorphone  Injectable 0.5 milliGRAM(s) IV Push every 6 hours PRN  influenza   Vaccine 0.5 milliLiter(s) IntraMuscular once  mycophenolate mofetil 500 milliGRAM(s) Oral two times a day  tacrolimus 1 milliGRAM(s) Oral <User Schedule>  tacrolimus 2 milliGRAM(s) Oral at bedtime  traMADol 25 milliGRAM(s) Oral every 6 hours PRN      PMHX/PSHX:  H/O secondary hyperparathyroidism  CKD (chronic kidney disease)  Lipoma  Gout  Hypertension  H/O polycystic kidney disease, autosomal dominant  H/O shoulder surgery  H/O umbilical hernia repair  S/P kidney transplant      Family history:  Family history of polycystic kidney disease, autosomal dominant      Social History:   Denies any toxic habits    ROS:   Fever+  Back pain+    PHYSICAL EXAM:   not examined today    Vital Signs:  Vital Signs Last 24 Hrs  T(C): 37 (25 Mar 2020 05:54), Max: 38.6 (24 Mar 2020 19:51)  T(F): 98.6 (25 Mar 2020 05:54), Max: 101.4 (24 Mar 2020 19:51)  HR: 61 (25 Mar 2020 05:54) (61 - 68)  BP: 127/64 (25 Mar 2020 05:54) (127/64 - 129/74)  BP(mean): --  RR: 18 (25 Mar 2020 05:54) (18 - 18)  SpO2: 95% (25 Mar 2020 05:54) (95% - 96%)  Daily     Daily     LABS:                        13.1   12.48 )-----------( 169      ( 25 Mar 2020 08:23 )             41.2     03-    134<L>  |  100  |  36<H>  ----------------------------<  133<H>  4.9   |  22  |  1.62<H>    Ca    9.2      25 Mar 2020 08:23  Phos  2.5       Mg     2.1         TPro  6.6  /  Alb  3.1<L>  /  TBili  1.5<H>  /  DBili  x   /  AST  47<H>  /  ALT  43  /  AlkPhos  362<H>      LIVER FUNCTIONS - ( 25 Mar 2020 08:23 )  Alb: 3.1 g/dL / Pro: 6.6 g/dL / ALK PHOS: 362 U/L / ALT: 43 U/L / AST: 47 U/L / GGT: x           PT/INR - ( 23 Mar 2020 23:31 )   PT: 16.4 sec;   INR: 1.42 ratio         PTT - ( 23 Mar 2020 23:31 )  PTT:27.5 sec  Urinalysis Basic - ( 23 Mar 2020 23:55 )    Color: Dark Yellow / Appearance: Slightly Turbid / S.031 / pH: x  Gluc: x / Ketone: Negative  / Bili: Small / Urobili: 4 mg/dL   Blood: x / Protein: 30 mg/dL / Nitrite: Negative   Leuk Esterase: Moderate / RBC: 3 /hpf / WBC 12 /HPF   Sq Epi: x / Non Sq Epi: 1 /hpf / Bacteria: Negative      Imaging:    < from: CT Abdomen and Pelvis No Cont (20 @ 18:37) >  EXAM:  CT ABDOMEN AND PELVIS                        PROCEDURE DATE:  2020    INTERPRETATION:  CLINICAL INFORMATION: Polycystic kidney disease presenting with back pain, fever, weight loss. Evaluate for infection, malignancy.  COMPARISON: CT 2014.    PROCEDURE:   CT of the Abdomen and Pelvis was performed without intravenous contrast.   Intravenous contrast: None.  Oral contrast: None.  Sagittal and coronal reformats were performed.    FINDINGS: Evaluation of the solid organs and vasculature is limited in the absence of intravenous contrast.  LOWER CHEST: Linear bibasilar atelectasis.    LIVER: Innumerable ill-defined low-attenuation and isodense masses, new since , indeterminate without contrast but concerning for metastatic disease.  BILE DUCTS: Mild left-sided intrahepatic biliary ductal dilatation.  GALLBLADDER: Within normal limits.  SPLEEN: Within normal limits.  PANCREAS: Focal masslike thickening of the pancreatic body is suspected, without ductal dilatation.  ADRENALS: Within normal limits.  KIDNEYS/URETERS: Right lower quadrant renal transplant with 1.2 cm cortical cyst. No hydronephrosis. Prominent native kidneys with innumerable cysts, some with calcifications.  Subcentimeter renal lesions are too small to characterize.    BLADDER: Within normal limits.  REPRODUCTIVE ORGANS: Enlarged prostate.    BOWEL: Colonic diverticulosis. No bowel obstruction. Appendix is normal.  PERITONEUM: No ascites.  VESSELS: Atherosclerotic changes.  RETROPERITONEUM/LYMPH NODES: No lymphadenopathy.    ABDOMINAL WALL: Within normal limits.  BONES: Degenerative changes.    IMPRESSION:   Innumerable new hepatic lesions, indeterminate without contrast, but metastatic disease is a consideration. Infection is considered less likely in the differential. Mild left intrahepatic biliary ductal dilatation.  Possible pancreatic body mass.  Contrast-enhanced CT or MRI is recommended if feasible based on renal function.  I discussed the findings with DEBBIE Hadley 3/24/2020 at 7:15 PM.  < end of copied text >

## 2020-03-25 NOTE — PROGRESS NOTE ADULT - PROBLEM SELECTOR PLAN 2
-febrile, leukocytosis. sepsis likely 2/2 uti  -management as above source unclear etiology  will continue to monitor  UTI ruled out

## 2020-03-25 NOTE — PROGRESS NOTE ADULT - PROBLEM SELECTOR PLAN 4
-Patient w/ transaminitis. differential includes: from recent heavy tylenol use vs malignancy r/o  -will obtain hep panel, CMV pcr, and CT abdomen imaging MRI abd pending  will await results  GI help appreciated  they feel a percutaneous liver biopsy is far safer at this time for tissue sampling  will d/w IR

## 2020-03-25 NOTE — CONSULT NOTE ADULT - ATTENDING COMMENTS
66 yo M pmh PCKD s/p Left renal xplant 2014, recent LLE DVT on a/c, found to have new panc mass with mets to liver.  Consulted for possible endoscopic evaluation/EUS biopsy.  On anatomy, there appears to be liver lesions that are accessible with IR guided biopsy.  As per current COVID19 endoscopy protocol, would attempt non endoscopic means of obtaining tissue at this time.  Can reconsider inpatient EUS if unable to get definitive diagnosis via other means and if treatment/prognosis would be changed by getting answer at this time rather than delay of 2 weeks.  May need additional imaging such as MRCP prior to procedure and will likely need to have a/c held if possible.      GI will s/o pending above.  Please call with any other question or if unable to get tissue dx via IR.

## 2020-03-25 NOTE — CONSULT NOTE ADULT - ASSESSMENT
65 year old male with polycystic kidney disease s/p Left kidney transplant 2014, HTN, history of spinal stenosis, recent LLE DVT (3/2020, on Eliquis) presenting with complaints of acute on chronic upper back pain now for one week.    IMPRESSION  - Pancreas body mass with multiple new liver lesions  - Back pain with mildly elevated liver enzymes (Tbili 1.5, AST 47, ALT 43, Alk phos 362), CT with mild left intrahepatic biliary ductal dilatation.  - LLE DVT (3/2020, on Eliquis)    RECOMMENDATION    - trend CMP, INR  - recommend MRI with MRCP to evaluate the biliary tree  - consider IR evaluation for biopsy of the liver lesion  - supportive care as per primary team    Jonathan Rangel, PGY-6  GI fellow  B- [id number]/ [phone]  Please call GI fellow on call after 5pm and on weekends 65 year old male with polycystic kidney disease s/p Left kidney transplant 2014, HTN, history of spinal stenosis, recent LLE DVT (3/2020, on Eliquis) presenting with complaints of acute on chronic upper back pain now for one week.    IMPRESSION  - Pancreas body mass with multiple new liver lesions  - Back pain with mildly elevated liver enzymes (Tbili 1.5, AST 47, ALT 43, Alk phos 362), CT with mild left intrahepatic biliary ductal dilatation.  - LLE DVT (3/2020, on Eliquis)    RECOMMENDATION    - trend CMP, INR  - recommend MRI with MRCP with IV contrast to evaluate the pancreas and liver   - recommend IR evaluation for biopsy of the liver lesion   - will need to hold Eliquis (for 2 days) and switch to heparin drip prior to biopsy  - currently no plan for endoscopic intervention   - supportive care as per primary team    Please call us back as needed    Jonathan Rangel, PGY-6  GI fellow  B- [id number]/ [phone]  Please call GI fellow on call after 5pm and on weekends 65 year old male with polycystic kidney disease s/p Left kidney transplant 2014, HTN, history of spinal stenosis, recent LLE DVT (3/2020, on Eliquis) presenting with complaints of acute on chronic upper back pain now for one week.    IMPRESSION  - Pancreas body mass with multiple new liver lesions  - Back pain with mildly elevated liver enzymes (Tbili 1.5, AST 47, ALT 43, Alk phos 362), CT with mild left intrahepatic biliary ductal dilatation, low suspicion for cholangitis   - LLE DVT (3/2020, on Eliquis)    RECOMMENDATION    - trend CMP, INR  - recommend MRI with MRCP with IV contrast to evaluate the pancreas and liver   - recommend IR evaluation for biopsy of the liver lesion   - will need to hold Eliquis (for 2 days) and switch to heparin drip prior to biopsy  - currently no plan for endoscopic intervention   - supportive care as per primary team    Please call us back as needed      Jonathan Rangel, PGY-6  GI fellow  B- [id number]/ [phone]  Please call GI fellow on call after 5pm and on weekends

## 2020-03-25 NOTE — PHARMACOTHERAPY INTERVENTION NOTE - COMMENTS
Pain regimen reviewed. Currently ordered for APAP PRN mild/mod pain, oxycodone PRN mod pain, tramadol PRN mod pain, and hydromorphone IV PRN sev pain. Would recommend continuing APA PRN for mild pain alone and discontinue tramadol PRN.    Lorenzo Gonzalez, PharmD, BCPS  [phone]

## 2020-03-25 NOTE — PROGRESS NOTE ADULT - PROBLEM SELECTOR PLAN 5
-Cr. currently 2.25 (baseline 1.3)  -s/p 1L IVF, likely from infection vs pre-renal  -s/p 1L IVF, will repeat BMP  -renal ultrasound ordered to r/o hydro likely secondary to dehydration  will continue to follow renal transplant team recommendations  defer adjustment of meds to the team  d/w patient's renal attending Dr Navas

## 2020-03-26 LAB
ALBUMIN SERPL ELPH-MCNC: 2.9 G/DL — LOW (ref 3.3–5)
ALP SERPL-CCNC: 342 U/L — HIGH (ref 40–120)
ALT FLD-CCNC: 38 U/L — SIGNIFICANT CHANGE UP (ref 10–45)
ANION GAP SERPL CALC-SCNC: 12 MMOL/L — SIGNIFICANT CHANGE UP (ref 5–17)
AST SERPL-CCNC: 45 U/L — HIGH (ref 10–40)
BASOPHILS # BLD AUTO: 0.04 K/UL — SIGNIFICANT CHANGE UP (ref 0–0.2)
BASOPHILS NFR BLD AUTO: 0.4 % — SIGNIFICANT CHANGE UP (ref 0–2)
BILIRUB SERPL-MCNC: 1.3 MG/DL — HIGH (ref 0.2–1.2)
BUN SERPL-MCNC: 37 MG/DL — HIGH (ref 7–23)
CALCIUM SERPL-MCNC: 8.9 MG/DL — SIGNIFICANT CHANGE UP (ref 8.4–10.5)
CHLORIDE SERPL-SCNC: 100 MMOL/L — SIGNIFICANT CHANGE UP (ref 96–108)
CO2 SERPL-SCNC: 23 MMOL/L — SIGNIFICANT CHANGE UP (ref 22–31)
CREAT SERPL-MCNC: 1.71 MG/DL — HIGH (ref 0.5–1.3)
EOSINOPHIL # BLD AUTO: 0.37 K/UL — SIGNIFICANT CHANGE UP (ref 0–0.5)
EOSINOPHIL NFR BLD AUTO: 3.6 % — SIGNIFICANT CHANGE UP (ref 0–6)
GLUCOSE SERPL-MCNC: 132 MG/DL — HIGH (ref 70–99)
HCT VFR BLD CALC: 38.2 % — LOW (ref 39–50)
HGB BLD-MCNC: 12.2 G/DL — LOW (ref 13–17)
IMM GRANULOCYTES NFR BLD AUTO: 0.5 % — SIGNIFICANT CHANGE UP (ref 0–1.5)
INR BLD: 1.3 RATIO — HIGH (ref 0.88–1.16)
LYMPHOCYTES # BLD AUTO: 0.78 K/UL — LOW (ref 1–3.3)
LYMPHOCYTES # BLD AUTO: 7.5 % — LOW (ref 13–44)
MCHC RBC-ENTMCNC: 26.8 PG — LOW (ref 27–34)
MCHC RBC-ENTMCNC: 31.9 GM/DL — LOW (ref 32–36)
MCV RBC AUTO: 83.8 FL — SIGNIFICANT CHANGE UP (ref 80–100)
MONOCYTES # BLD AUTO: 1.15 K/UL — HIGH (ref 0–0.9)
MONOCYTES NFR BLD AUTO: 11.1 % — SIGNIFICANT CHANGE UP (ref 2–14)
NEUTROPHILS # BLD AUTO: 7.95 K/UL — HIGH (ref 1.8–7.4)
NEUTROPHILS NFR BLD AUTO: 76.9 % — SIGNIFICANT CHANGE UP (ref 43–77)
NRBC # BLD: 0 /100 WBCS — SIGNIFICANT CHANGE UP (ref 0–0)
PLATELET # BLD AUTO: 138 K/UL — LOW (ref 150–400)
POTASSIUM SERPL-MCNC: 4.9 MMOL/L — SIGNIFICANT CHANGE UP (ref 3.5–5.3)
POTASSIUM SERPL-SCNC: 4.9 MMOL/L — SIGNIFICANT CHANGE UP (ref 3.5–5.3)
PROT SERPL-MCNC: 6.2 G/DL — SIGNIFICANT CHANGE UP (ref 6–8.3)
PROTHROM AB SERPL-ACNC: 14.9 SEC — HIGH (ref 10–13.1)
RBC # BLD: 4.56 M/UL — SIGNIFICANT CHANGE UP (ref 4.2–5.8)
RBC # FLD: 14.2 % — SIGNIFICANT CHANGE UP (ref 10.3–14.5)
SODIUM SERPL-SCNC: 135 MMOL/L — SIGNIFICANT CHANGE UP (ref 135–145)
TACROLIMUS SERPL-MCNC: 8.1 NG/ML — SIGNIFICANT CHANGE UP
WBC # BLD: 10.34 K/UL — SIGNIFICANT CHANGE UP (ref 3.8–10.5)
WBC # FLD AUTO: 10.34 K/UL — SIGNIFICANT CHANGE UP (ref 3.8–10.5)

## 2020-03-26 PROCEDURE — 99232 SBSQ HOSP IP/OBS MODERATE 35: CPT

## 2020-03-26 RX ORDER — ENOXAPARIN SODIUM 100 MG/ML
80 INJECTION SUBCUTANEOUS EVERY 12 HOURS
Refills: 0 | Status: DISCONTINUED | OUTPATIENT
Start: 2020-03-26 | End: 2020-03-29

## 2020-03-26 RX ORDER — OXYCODONE HYDROCHLORIDE 5 MG/1
5 TABLET ORAL EVERY 4 HOURS
Refills: 0 | Status: DISCONTINUED | OUTPATIENT
Start: 2020-03-26 | End: 2020-03-30

## 2020-03-26 RX ADMIN — HYDROMORPHONE HYDROCHLORIDE 0.5 MILLIGRAM(S): 2 INJECTION INTRAMUSCULAR; INTRAVENOUS; SUBCUTANEOUS at 06:23

## 2020-03-26 RX ADMIN — OXYCODONE HYDROCHLORIDE 5 MILLIGRAM(S): 5 TABLET ORAL at 13:32

## 2020-03-26 RX ADMIN — OXYCODONE HYDROCHLORIDE 5 MILLIGRAM(S): 5 TABLET ORAL at 17:25

## 2020-03-26 RX ADMIN — OXYCODONE HYDROCHLORIDE 5 MILLIGRAM(S): 5 TABLET ORAL at 09:20

## 2020-03-26 RX ADMIN — OXYCODONE HYDROCHLORIDE 5 MILLIGRAM(S): 5 TABLET ORAL at 10:50

## 2020-03-26 RX ADMIN — POLYETHYLENE GLYCOL 3350 17 GRAM(S): 17 POWDER, FOR SOLUTION ORAL at 12:33

## 2020-03-26 RX ADMIN — HEPARIN SODIUM 5000 UNIT(S): 5000 INJECTION INTRAVENOUS; SUBCUTANEOUS at 06:13

## 2020-03-26 RX ADMIN — OXYCODONE HYDROCHLORIDE 5 MILLIGRAM(S): 5 TABLET ORAL at 04:00

## 2020-03-26 RX ADMIN — HYDROMORPHONE HYDROCHLORIDE 0.5 MILLIGRAM(S): 2 INJECTION INTRAMUSCULAR; INTRAVENOUS; SUBCUTANEOUS at 21:59

## 2020-03-26 RX ADMIN — CEFTRIAXONE 100 MILLIGRAM(S): 500 INJECTION, POWDER, FOR SOLUTION INTRAMUSCULAR; INTRAVENOUS at 16:14

## 2020-03-26 RX ADMIN — Medication 650 MILLIGRAM(S): at 06:18

## 2020-03-26 RX ADMIN — TACROLIMUS 1 MILLIGRAM(S): 5 CAPSULE ORAL at 08:36

## 2020-03-26 RX ADMIN — HYDROMORPHONE HYDROCHLORIDE 0.5 MILLIGRAM(S): 2 INJECTION INTRAMUSCULAR; INTRAVENOUS; SUBCUTANEOUS at 22:13

## 2020-03-26 RX ADMIN — ENOXAPARIN SODIUM 80 MILLIGRAM(S): 100 INJECTION SUBCUTANEOUS at 17:22

## 2020-03-26 RX ADMIN — OXYCODONE HYDROCHLORIDE 5 MILLIGRAM(S): 5 TABLET ORAL at 14:32

## 2020-03-26 RX ADMIN — TACROLIMUS 2 MILLIGRAM(S): 5 CAPSULE ORAL at 21:59

## 2020-03-26 RX ADMIN — OXYCODONE HYDROCHLORIDE 5 MILLIGRAM(S): 5 TABLET ORAL at 18:25

## 2020-03-26 RX ADMIN — OXYCODONE HYDROCHLORIDE 5 MILLIGRAM(S): 5 TABLET ORAL at 03:38

## 2020-03-26 RX ADMIN — HYDROMORPHONE HYDROCHLORIDE 0.5 MILLIGRAM(S): 2 INJECTION INTRAMUSCULAR; INTRAVENOUS; SUBCUTANEOUS at 06:12

## 2020-03-26 NOTE — PROGRESS NOTE ADULT - SUBJECTIVE AND OBJECTIVE BOX
Patient is a 65y old  Male who presents with a chief complaint of fever, back pain (26 Mar 2020 11:08)      SUBJECTIVE / OVERNIGHT EVENTS: overnight events noted    ROS:  Resp: No cough no sputum production  CVS: No chest pain no palpitations no orthopnea  GI: no N/V/D  : no dysuria, no hematuria  Neuro: no weakness no paresthesias  Heme: No petechiae no easy bruising  Msk: No joint pain no swelling  Skin: No rash no itching        MEDICATIONS  (STANDING):  cefTRIAXone   IVPB 1000 milliGRAM(s) IV Intermittent every 24 hours  enoxaparin Injectable 80 milliGRAM(s) SubCutaneous every 12 hours  influenza   Vaccine 0.5 milliLiter(s) IntraMuscular once  polyethylene glycol 3350 17 Gram(s) Oral daily  tacrolimus 1 milliGRAM(s) Oral <User Schedule>  tacrolimus 2 milliGRAM(s) Oral at bedtime    MEDICATIONS  (PRN):  acetaminophen   Tablet .. 650 milliGRAM(s) Oral every 6 hours PRN Temp greater or equal to 38C (100.4F), Mild Pain (1 - 3)  HYDROmorphone  Injectable 0.5 milliGRAM(s) IV Push every 6 hours PRN Severe Pain (7 - 10)  oxyCODONE    IR 5 milliGRAM(s) Oral every 4 hours PRN Moderate Pain (4 - 6)        CAPILLARY BLOOD GLUCOSE        I&O's Summary    25 Mar 2020 07:01  -  26 Mar 2020 07:00  --------------------------------------------------------  IN: 830 mL / OUT: 800 mL / NET: 30 mL    26 Mar 2020 07:01  -  26 Mar 2020 13:38  --------------------------------------------------------  IN: 240 mL / OUT: 100 mL / NET: 140 mL        Vital Signs Last 24 Hrs  T(C): 37.1 (26 Mar 2020 05:07), Max: 37.1 (26 Mar 2020 05:07)  T(F): 98.7 (26 Mar 2020 05:07), Max: 98.7 (26 Mar 2020 05:07)  HR: 61 (26 Mar 2020 05:07) (61 - 82)  BP: 125/78 (26 Mar 2020 12:33) (114/70 - 126/65)  BP(mean): --  RR: 18 (26 Mar 2020 05:07) (18 - 18)  SpO2: 95% (26 Mar 2020 05:07) (95% - 98%)    PHYSICAL EXAM:  GENERAL: in no apparent distress  asthenic  HEAD:  Atraumatic, Normocephalic  EYES: EOMI, PERRLA, conjunctiva and sclera clear  NECK: Supple, No JVD  CHEST/LUNG: no wheeze, clear to auscultation bilaterally  HEART: S1 S2; No rubs or gallops, no murmurs appreciated  ABDOMEN: Soft, Nontender, Nondistended; Bowel sounds present  EXTREMITIES:  No clubbing or cyanosis, + Peripheral Pulses,  no edema  PSYCH: AO x 3 appropriate affect  NEUROLOGY: non-focal, motor and sensory systems intact  SKIN: No rashes or lesions    LABS:                        12.2   10.34 )-----------( 138      ( 26 Mar 2020 06:55 )             38.2     03-26    135  |  100  |  37<H>  ----------------------------<  132<H>  4.9   |  23  |  1.71<H>    Ca    8.9      26 Mar 2020 06:53  Phos  2.5     03-25  Mg     2.1     03-25    TPro  6.2  /  Alb  2.9<L>  /  TBili  1.3<H>  /  DBili  x   /  AST  45<H>  /  ALT  38  /  AlkPhos  342<H>  03-26    PT/INR - ( 26 Mar 2020 08:16 )   PT: 14.9 sec;   INR: 1.30 ratio                     All consultant(s) notes reviewed and care discussed with other providers        Contact Number, Dr Johnson [phone]

## 2020-03-26 NOTE — PROGRESS NOTE ADULT - PROBLEM SELECTOR PLAN 5
will continue to follow renal transplant team recommendations  defer adjustment of meds to the team  d/w patient's renal attending Dr Navas

## 2020-03-26 NOTE — PROGRESS NOTE ADULT - PROBLEM SELECTOR PLAN 2
source unclear etiology  will continue to monitor  UTI ruled out  CT chest negative pneumonia  discontinue antibiotics

## 2020-03-26 NOTE — PROGRESS NOTE ADULT - SUBJECTIVE AND OBJECTIVE BOX
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   Baseline creatinine of 1.3mg/dL. He is followed by Dr. Navas     PMH includes spinal stenosis, HTN. He had acute LLE DVT 3 weeks ago and started on Eliquis.   Presents with worsening back pain,  weakness, fever of 101 at home.  Pain has been going on for at least 3 months and worsened over the past few weeks unresponsive to Tylenol and aleve   He also has weight loss > 10lbs. Noted to have liver and pancreatic lesions on CT suspicious for metastatic pancreatic cancer.       24 hour events/subjective:  MRI performed yesterday, confirmed pancreatic and liver masses.   Pain control improved today but still has a lot of back pain   Fever has subsided.   No nausea or vomiting , appetite is fair   Awaiting liver biopsy       Standing Inpatient Medications  cefTRIAXone   IVPB 1000 milliGRAM(s) IV Intermittent every 24 hours  enoxaparin Injectable 80 milliGRAM(s) SubCutaneous every 12 hours  heparin  Injectable 5000 Unit(s) SubCutaneous every 12 hours  influenza   Vaccine 0.5 milliLiter(s) IntraMuscular once  polyethylene glycol 3350 17 Gram(s) Oral daily  tacrolimus 1 milliGRAM(s) Oral <User Schedule>  tacrolimus 2 milliGRAM(s) Oral at bedtime    PRN Inpatient Medications  acetaminophen   Tablet .. 650 milliGRAM(s) Oral every 6 hours PRN  HYDROmorphone  Injectable 0.5 milliGRAM(s) IV Push every 6 hours PRN  oxyCODONE    IR 5 milliGRAM(s) Oral every 4 hours PRN        VITALS/PHYSICAL EXAM  --------------------------------------------------------------------------------  T(C): 37.1 (03-26-20 @ 05:07), Max: 37.1 (03-26-20 @ 05:07)  HR: 61 (03-26-20 @ 05:07) (61 - 86)  BP: 126/65 (03-26-20 @ 05:07) (113/72 - 126/65)  RR: 18 (03-26-20 @ 05:07) (18 - 18)  SpO2: 95% (03-26-20 @ 05:07) (95% - 98%)      03-25-20 @ 07:01  -  03-26-20 @ 07:00  --------------------------------------------------------  IN: 830 mL / OUT: 800 mL / NET: 30 mL      Physical Exam:  Alert, no acute distress, sitting in bed eating breakfast   Appears a lot more comfortable today but still complains of back pain although improved from yesterday   Lungs clear   regular S1 and s2  abdomen soft and non tender   no edema         LABS/STUDIES  --------------------------------------------------------------------------------              12.2   10.34 >-----------<  138      [03-26-20 @ 06:55]              38.2     135  |  100  |  37  ----------------------------<  132      [03-26-20 @ 06:53]  4.9   |  23  |  1.71        Ca     8.9     [03-26-20 @ 06:53]      Mg     2.1     [03-25-20 @ 08:23]      Phos  2.5     [03-25-20 @ 08:23]    TPro  6.2  /  Alb  2.9  /  TBili  1.3  /  DBili  x   /  AST  45  /  ALT  38  /  AlkPhos  342  [03-26-20 @ 06:53]    PT/INR: PT 14.9 , INR 1.30       [03-26-20 @ 08:16]      Creatinine Trend:  SCr 1.71 [03-26 @ 06:53]  SCr 1.62 [03-25 @ 08:23]  SCr 1.74 [03-24 @ 15:41]  SCr 2.25 [03-23 @ 23:31]  SCr 1.39 [03-08 @ 14:53]    Tacrolimus (), Serum: 8.1 ng/mL (03-26 @ 02:14)  Tacrolimus (), Serum: 6.5 ng/mL (03-25 @ 10:03)      Urinalysis - [03-23-20 @ 23:55]      Color Dark Yellow / Appearance Slightly Turbid / SG 1.031 / pH 5.5      Gluc Negative / Ketone Negative  / Bili Small / Urobili 4 mg/dL       Blood Negative / Protein 30 mg/dL / Leuk Est Moderate / Nitrite Negative      RBC 3 / WBC 12 / Hyaline 7 / Gran  / Sq Epi  / Non Sq Epi 1 / Bacteria Negative      HBsAg Nonreact      [03-25-20 @ 11:54]  HCV 0.10, Nonreact      [03-25-20 @ 11:54]

## 2020-03-26 NOTE — PROGRESS NOTE ADULT - PROBLEM SELECTOR PLAN 4
MRI abd noted with pancreatic mass and nemerous mets  GI help appreciated  liver biopsy Monday  discontinued Apixaban  start Lovenox tonight as left leg DVT persistent   80 mg BID

## 2020-03-27 LAB
ANION GAP SERPL CALC-SCNC: 13 MMOL/L — SIGNIFICANT CHANGE UP (ref 5–17)
BUN SERPL-MCNC: 38 MG/DL — HIGH (ref 7–23)
CALCIUM SERPL-MCNC: 8.7 MG/DL — SIGNIFICANT CHANGE UP (ref 8.4–10.5)
CHLORIDE SERPL-SCNC: 103 MMOL/L — SIGNIFICANT CHANGE UP (ref 96–108)
CMV DNA CSF QL NAA+PROBE: SIGNIFICANT CHANGE UP
CMV DNA SPEC NAA+PROBE-LOG#: ABNORMAL LOG10IU/ML
CO2 SERPL-SCNC: 20 MMOL/L — LOW (ref 22–31)
CREAT SERPL-MCNC: 1.77 MG/DL — HIGH (ref 0.5–1.3)
GLUCOSE SERPL-MCNC: 118 MG/DL — HIGH (ref 70–99)
HCT VFR BLD CALC: 36.8 % — LOW (ref 39–50)
HGB BLD-MCNC: 11.4 G/DL — LOW (ref 13–17)
INR BLD: 1.21 RATIO — HIGH (ref 0.88–1.16)
MCHC RBC-ENTMCNC: 26 PG — LOW (ref 27–34)
MCHC RBC-ENTMCNC: 31 GM/DL — LOW (ref 32–36)
MCV RBC AUTO: 84 FL — SIGNIFICANT CHANGE UP (ref 80–100)
NRBC # BLD: 0 /100 WBCS — SIGNIFICANT CHANGE UP (ref 0–0)
PLATELET # BLD AUTO: 147 K/UL — LOW (ref 150–400)
POTASSIUM SERPL-MCNC: 4.8 MMOL/L — SIGNIFICANT CHANGE UP (ref 3.5–5.3)
POTASSIUM SERPL-SCNC: 4.8 MMOL/L — SIGNIFICANT CHANGE UP (ref 3.5–5.3)
PROTHROM AB SERPL-ACNC: 13.8 SEC — HIGH (ref 10–13.1)
RBC # BLD: 4.38 M/UL — SIGNIFICANT CHANGE UP (ref 4.2–5.8)
RBC # FLD: 14.1 % — SIGNIFICANT CHANGE UP (ref 10.3–14.5)
SODIUM SERPL-SCNC: 136 MMOL/L — SIGNIFICANT CHANGE UP (ref 135–145)
TACROLIMUS SERPL-MCNC: 13.5 NG/ML — SIGNIFICANT CHANGE UP
WBC # BLD: 8.46 K/UL — SIGNIFICANT CHANGE UP (ref 3.8–10.5)
WBC # FLD AUTO: 8.46 K/UL — SIGNIFICANT CHANGE UP (ref 3.8–10.5)

## 2020-03-27 RX ORDER — OXYCODONE HYDROCHLORIDE 5 MG/1
10 TABLET ORAL EVERY 12 HOURS
Refills: 0 | Status: DISCONTINUED | OUTPATIENT
Start: 2020-03-27 | End: 2020-03-30

## 2020-03-27 RX ADMIN — OXYCODONE HYDROCHLORIDE 5 MILLIGRAM(S): 5 TABLET ORAL at 15:21

## 2020-03-27 RX ADMIN — OXYCODONE HYDROCHLORIDE 5 MILLIGRAM(S): 5 TABLET ORAL at 14:21

## 2020-03-27 RX ADMIN — POLYETHYLENE GLYCOL 3350 17 GRAM(S): 17 POWDER, FOR SOLUTION ORAL at 11:12

## 2020-03-27 RX ADMIN — HYDROMORPHONE HYDROCHLORIDE 0.5 MILLIGRAM(S): 2 INJECTION INTRAMUSCULAR; INTRAVENOUS; SUBCUTANEOUS at 22:23

## 2020-03-27 RX ADMIN — OXYCODONE HYDROCHLORIDE 5 MILLIGRAM(S): 5 TABLET ORAL at 09:24

## 2020-03-27 RX ADMIN — TACROLIMUS 1 MILLIGRAM(S): 5 CAPSULE ORAL at 09:32

## 2020-03-27 RX ADMIN — HYDROMORPHONE HYDROCHLORIDE 0.5 MILLIGRAM(S): 2 INJECTION INTRAMUSCULAR; INTRAVENOUS; SUBCUTANEOUS at 22:40

## 2020-03-27 RX ADMIN — TACROLIMUS 2 MILLIGRAM(S): 5 CAPSULE ORAL at 22:20

## 2020-03-27 RX ADMIN — ENOXAPARIN SODIUM 80 MILLIGRAM(S): 100 INJECTION SUBCUTANEOUS at 17:44

## 2020-03-27 RX ADMIN — OXYCODONE HYDROCHLORIDE 10 MILLIGRAM(S): 5 TABLET ORAL at 17:44

## 2020-03-27 RX ADMIN — HYDROMORPHONE HYDROCHLORIDE 0.5 MILLIGRAM(S): 2 INJECTION INTRAMUSCULAR; INTRAVENOUS; SUBCUTANEOUS at 05:15

## 2020-03-27 RX ADMIN — HYDROMORPHONE HYDROCHLORIDE 0.5 MILLIGRAM(S): 2 INJECTION INTRAMUSCULAR; INTRAVENOUS; SUBCUTANEOUS at 04:22

## 2020-03-27 RX ADMIN — OXYCODONE HYDROCHLORIDE 5 MILLIGRAM(S): 5 TABLET ORAL at 08:24

## 2020-03-27 RX ADMIN — ENOXAPARIN SODIUM 80 MILLIGRAM(S): 100 INJECTION SUBCUTANEOUS at 06:08

## 2020-03-27 RX ADMIN — HYDROMORPHONE HYDROCHLORIDE 0.5 MILLIGRAM(S): 2 INJECTION INTRAMUSCULAR; INTRAVENOUS; SUBCUTANEOUS at 11:10

## 2020-03-27 RX ADMIN — OXYCODONE HYDROCHLORIDE 10 MILLIGRAM(S): 5 TABLET ORAL at 18:44

## 2020-03-27 RX ADMIN — HYDROMORPHONE HYDROCHLORIDE 0.5 MILLIGRAM(S): 2 INJECTION INTRAMUSCULAR; INTRAVENOUS; SUBCUTANEOUS at 11:25

## 2020-03-27 NOTE — PROGRESS NOTE ADULT - SUBJECTIVE AND OBJECTIVE BOX
Patient is a 65y old  Male who presents with a chief complaint of fever, back pain (26 Mar 2020 13:38)      SUBJECTIVE / OVERNIGHT EVENTS: overnight events noted  continues to c/o back pain    ROS:  Resp: No cough no sputum production  CVS: No chest pain no palpitations no orthopnea  GI: no N/V/D  : no dysuria, no hematuria  Neuro: no weakness no paresthesias  Heme: No petechiae no easy bruising  Msk: No joint pain no swelling  Skin: No rash no itching        MEDICATIONS  (STANDING):  cefTRIAXone   IVPB 1000 milliGRAM(s) IV Intermittent every 24 hours  enoxaparin Injectable 80 milliGRAM(s) SubCutaneous every 12 hours  influenza   Vaccine 0.5 milliLiter(s) IntraMuscular once  polyethylene glycol 3350 17 Gram(s) Oral daily  tacrolimus 1 milliGRAM(s) Oral <User Schedule>  tacrolimus 2 milliGRAM(s) Oral at bedtime    MEDICATIONS  (PRN):  acetaminophen   Tablet .. 650 milliGRAM(s) Oral every 6 hours PRN Temp greater or equal to 38C (100.4F), Mild Pain (1 - 3)  HYDROmorphone  Injectable 0.5 milliGRAM(s) IV Push every 6 hours PRN Severe Pain (7 - 10)  oxyCODONE    IR 5 milliGRAM(s) Oral every 4 hours PRN Moderate Pain (4 - 6)        CAPILLARY BLOOD GLUCOSE        I&O's Summary    26 Mar 2020 07:01  -  27 Mar 2020 07:00  --------------------------------------------------------  IN: 810 mL / OUT: 750 mL / NET: 60 mL    27 Mar 2020 07:01  -  27 Mar 2020 13:43  --------------------------------------------------------  IN: 440 mL / OUT: 0 mL / NET: 440 mL        Vital Signs Last 24 Hrs  T(C): 36.7 (27 Mar 2020 04:10), Max: 36.9 (27 Mar 2020 00:53)  T(F): 98 (27 Mar 2020 04:10), Max: 98.5 (27 Mar 2020 00:53)  HR: 68 (27 Mar 2020 04:10) (57 - 68)  BP: 128/73 (27 Mar 2020 04:10) (121/67 - 147/78)  BP(mean): --  RR: 18 (27 Mar 2020 04:10) (18 - 18)  SpO2: 94% (27 Mar 2020 04:10) (94% - 97%)    PHYSICAL EXAM:  GENERAL: in no apparent distress  HEAD:  Atraumatic, Normocephalic  EYES: EOMI, PERRLA, conjunctiva and sclera clear  NECK: Supple, No JVD  CHEST/LUNG: no wheeze, clear to auscultation bilaterally  HEART: S1 S2; No rubs or gallops, no murmurs appreciated  ABDOMEN: Soft, Nontender, Nondistended; Bowel sounds present  EXTREMITIES:  No clubbing or cyanosis, + Peripheral Pulses,  no edema  PSYCH: AO x 3 appropriate affect  NEUROLOGY: non-focal, motor and sensory systems intact  SKIN: No rashes or lesions    LABS:                        11.4   8.46  )-----------( 147      ( 27 Mar 2020 06:37 )             36.8     03-27    136  |  103  |  38<H>  ----------------------------<  118<H>  4.8   |  20<L>  |  1.77<H>    Ca    8.7      27 Mar 2020 06:37    TPro  6.2  /  Alb  2.9<L>  /  TBili  1.3<H>  /  DBili  x   /  AST  45<H>  /  ALT  38  /  AlkPhos  342<H>  03-26    PT/INR - ( 27 Mar 2020 08:34 )   PT: 13.8 sec;   INR: 1.21 ratio                     All consultant(s) notes reviewed and care discussed with other providers        Contact Number, Dr Johnson [phone]

## 2020-03-27 NOTE — PROGRESS NOTE ADULT - PROBLEM SELECTOR PLAN 5
Low vit D  Start Ergocalciderol 50,000 units PO Q weekly for 12 weeks  Repeat BMP, PO4, PTH, Vit D, H/H, urine microalbumin prior to next OV will continue to follow renal transplant team recommendations  defer adjustment of meds to the team

## 2020-03-27 NOTE — PROGRESS NOTE ADULT - PROBLEM SELECTOR PLAN 3
likely secondary to metastatic pancreatic cancer  will continue Oxy IR as needed q 4  add Oxy ER 10 BID for background pain relief

## 2020-03-27 NOTE — PROGRESS NOTE ADULT - PROBLEM SELECTOR PLAN 4
MRI abd noted with pancreatic mass and numerous mets  liver biopsy Monday  discontinued Apixaban  continue Lovenox as left leg DVT persistent on duplex  will hold enoxaparin after dose Sunday AM

## 2020-03-27 NOTE — PROGRESS NOTE ADULT - ATTENDING COMMENTS
discussed with patient in detail, expresses understanding of treatment plans.  with patient's permission discussed with daughter Lynne over the phone in detail   the patient and family are aware of the risks of continued hospitalization during this Covid-19 epidemic and of actually barron Covid-19 infection   they prefer to take that risk as opposed to waiting for outpatient scheduling of liver biopsy

## 2020-03-27 NOTE — PROGRESS NOTE ADULT - PROBLEM SELECTOR PLAN 1
blood cultures and urine culture negative  UTI and pneumonia ruled out  will discontinue antibiotics

## 2020-03-28 LAB
ALBUMIN SERPL ELPH-MCNC: 2.8 G/DL — LOW (ref 3.3–5)
ALP SERPL-CCNC: 335 U/L — HIGH (ref 40–120)
ALT FLD-CCNC: 35 U/L — SIGNIFICANT CHANGE UP (ref 10–45)
ANION GAP SERPL CALC-SCNC: 13 MMOL/L — SIGNIFICANT CHANGE UP (ref 5–17)
AST SERPL-CCNC: 42 U/L — HIGH (ref 10–40)
BILIRUB SERPL-MCNC: 1.6 MG/DL — HIGH (ref 0.2–1.2)
BUN SERPL-MCNC: 43 MG/DL — HIGH (ref 7–23)
CALCIUM SERPL-MCNC: 8.9 MG/DL — SIGNIFICANT CHANGE UP (ref 8.4–10.5)
CHLORIDE SERPL-SCNC: 105 MMOL/L — SIGNIFICANT CHANGE UP (ref 96–108)
CO2 SERPL-SCNC: 19 MMOL/L — LOW (ref 22–31)
CREAT SERPL-MCNC: 1.75 MG/DL — HIGH (ref 0.5–1.3)
GLUCOSE SERPL-MCNC: 127 MG/DL — HIGH (ref 70–99)
HCT VFR BLD CALC: 36.7 % — LOW (ref 39–50)
HGB BLD-MCNC: 11.5 G/DL — LOW (ref 13–17)
INR BLD: 1.14 RATIO — SIGNIFICANT CHANGE UP (ref 0.88–1.16)
MCHC RBC-ENTMCNC: 26.1 PG — LOW (ref 27–34)
MCHC RBC-ENTMCNC: 31.3 GM/DL — LOW (ref 32–36)
MCV RBC AUTO: 83.2 FL — SIGNIFICANT CHANGE UP (ref 80–100)
NRBC # BLD: 0 /100 WBCS — SIGNIFICANT CHANGE UP (ref 0–0)
PLATELET # BLD AUTO: 156 K/UL — SIGNIFICANT CHANGE UP (ref 150–400)
POTASSIUM SERPL-MCNC: 4.8 MMOL/L — SIGNIFICANT CHANGE UP (ref 3.5–5.3)
POTASSIUM SERPL-SCNC: 4.8 MMOL/L — SIGNIFICANT CHANGE UP (ref 3.5–5.3)
PROT SERPL-MCNC: 5.9 G/DL — LOW (ref 6–8.3)
PROTHROM AB SERPL-ACNC: 13.1 SEC — SIGNIFICANT CHANGE UP (ref 10–13.1)
RBC # BLD: 4.41 M/UL — SIGNIFICANT CHANGE UP (ref 4.2–5.8)
RBC # FLD: 14.2 % — SIGNIFICANT CHANGE UP (ref 10.3–14.5)
SODIUM SERPL-SCNC: 137 MMOL/L — SIGNIFICANT CHANGE UP (ref 135–145)
TACROLIMUS SERPL-MCNC: 11.3 NG/ML — SIGNIFICANT CHANGE UP
WBC # BLD: 9.16 K/UL — SIGNIFICANT CHANGE UP (ref 3.8–10.5)
WBC # FLD AUTO: 9.16 K/UL — SIGNIFICANT CHANGE UP (ref 3.8–10.5)

## 2020-03-28 PROCEDURE — 99231 SBSQ HOSP IP/OBS SF/LOW 25: CPT

## 2020-03-28 RX ORDER — GABAPENTIN 400 MG/1
100 CAPSULE ORAL EVERY 12 HOURS
Refills: 0 | Status: DISCONTINUED | OUTPATIENT
Start: 2020-03-28 | End: 2020-03-31

## 2020-03-28 RX ADMIN — GABAPENTIN 100 MILLIGRAM(S): 400 CAPSULE ORAL at 18:26

## 2020-03-28 RX ADMIN — OXYCODONE HYDROCHLORIDE 5 MILLIGRAM(S): 5 TABLET ORAL at 19:42

## 2020-03-28 RX ADMIN — ENOXAPARIN SODIUM 80 MILLIGRAM(S): 100 INJECTION SUBCUTANEOUS at 18:27

## 2020-03-28 RX ADMIN — OXYCODONE HYDROCHLORIDE 10 MILLIGRAM(S): 5 TABLET ORAL at 18:26

## 2020-03-28 RX ADMIN — OXYCODONE HYDROCHLORIDE 5 MILLIGRAM(S): 5 TABLET ORAL at 09:36

## 2020-03-28 RX ADMIN — ENOXAPARIN SODIUM 80 MILLIGRAM(S): 100 INJECTION SUBCUTANEOUS at 06:16

## 2020-03-28 RX ADMIN — OXYCODONE HYDROCHLORIDE 10 MILLIGRAM(S): 5 TABLET ORAL at 07:04

## 2020-03-28 RX ADMIN — OXYCODONE HYDROCHLORIDE 10 MILLIGRAM(S): 5 TABLET ORAL at 19:26

## 2020-03-28 RX ADMIN — OXYCODONE HYDROCHLORIDE 10 MILLIGRAM(S): 5 TABLET ORAL at 06:16

## 2020-03-28 RX ADMIN — TACROLIMUS 1 MILLIGRAM(S): 5 CAPSULE ORAL at 08:34

## 2020-03-28 RX ADMIN — POLYETHYLENE GLYCOL 3350 17 GRAM(S): 17 POWDER, FOR SOLUTION ORAL at 12:37

## 2020-03-28 RX ADMIN — HYDROMORPHONE HYDROCHLORIDE 0.5 MILLIGRAM(S): 2 INJECTION INTRAMUSCULAR; INTRAVENOUS; SUBCUTANEOUS at 16:18

## 2020-03-28 RX ADMIN — OXYCODONE HYDROCHLORIDE 5 MILLIGRAM(S): 5 TABLET ORAL at 12:37

## 2020-03-28 RX ADMIN — HYDROMORPHONE HYDROCHLORIDE 0.5 MILLIGRAM(S): 2 INJECTION INTRAMUSCULAR; INTRAVENOUS; SUBCUTANEOUS at 05:02

## 2020-03-28 RX ADMIN — HYDROMORPHONE HYDROCHLORIDE 0.5 MILLIGRAM(S): 2 INJECTION INTRAMUSCULAR; INTRAVENOUS; SUBCUTANEOUS at 16:03

## 2020-03-28 RX ADMIN — TACROLIMUS 2 MILLIGRAM(S): 5 CAPSULE ORAL at 21:43

## 2020-03-28 RX ADMIN — OXYCODONE HYDROCHLORIDE 5 MILLIGRAM(S): 5 TABLET ORAL at 21:04

## 2020-03-28 RX ADMIN — OXYCODONE HYDROCHLORIDE 5 MILLIGRAM(S): 5 TABLET ORAL at 08:36

## 2020-03-28 RX ADMIN — OXYCODONE HYDROCHLORIDE 5 MILLIGRAM(S): 5 TABLET ORAL at 13:37

## 2020-03-28 RX ADMIN — HYDROMORPHONE HYDROCHLORIDE 0.5 MILLIGRAM(S): 2 INJECTION INTRAMUSCULAR; INTRAVENOUS; SUBCUTANEOUS at 05:31

## 2020-03-28 NOTE — PROGRESS NOTE ADULT - ATTENDING COMMENTS
discussed with patient in detail, expresses understanding of treatment plans.  d/w daughter Lynne with patient's permission over the phone in detail

## 2020-03-28 NOTE — PROGRESS NOTE ADULT - SUBJECTIVE AND OBJECTIVE BOX
Patient is a 65y old  Male who presents with a chief complaint of fever, back pain (27 Mar 2020 13:43)    SUBJECTIVE / OVERNIGHT EVENTS: overnight events noted    ROS:  Resp: No cough no sputum production  CVS: No chest pain no palpitations no orthopnea  GI: no N/V/D  : no dysuria, no hematuria  Neuro: no weakness no paresthesias  Heme: No petechiae no easy bruising  Msk: No joint pain no swelling  Skin: No rash no itching        MEDICATIONS  (STANDING):  enoxaparin Injectable 80 milliGRAM(s) SubCutaneous every 12 hours  influenza   Vaccine 0.5 milliLiter(s) IntraMuscular once  oxyCODONE  ER Tablet 10 milliGRAM(s) Oral every 12 hours  polyethylene glycol 3350 17 Gram(s) Oral daily  tacrolimus 1 milliGRAM(s) Oral <User Schedule>  tacrolimus 2 milliGRAM(s) Oral at bedtime    MEDICATIONS  (PRN):  acetaminophen   Tablet .. 650 milliGRAM(s) Oral every 6 hours PRN Temp greater or equal to 38C (100.4F), Mild Pain (1 - 3)  HYDROmorphone  Injectable 0.5 milliGRAM(s) IV Push every 6 hours PRN Severe Pain (7 - 10)  oxyCODONE    IR 5 milliGRAM(s) Oral every 4 hours PRN Moderate Pain (4 - 6)        CAPILLARY BLOOD GLUCOSE        I&O's Summary    27 Mar 2020 07:01  -  28 Mar 2020 07:00  --------------------------------------------------------  IN: 620 mL / OUT: 200 mL / NET: 420 mL        Vital Signs Last 24 Hrs  T(C): 36.8 (28 Mar 2020 04:44), Max: 37.4 (27 Mar 2020 21:15)  T(F): 98.2 (28 Mar 2020 04:44), Max: 99.3 (27 Mar 2020 21:15)  HR: 68 (28 Mar 2020 04:44) (65 - 68)  BP: 131/79 (28 Mar 2020 04:44) (121/65 - 148/70)  BP(mean): --  RR: 18 (28 Mar 2020 04:44) (18 - 18)  SpO2: 95% (28 Mar 2020 04:44) (95% - 96%)    PHYSICAL EXAM:  GENERAL: in no apparent distress  HEAD:  Atraumatic, Normocephalic  EYES: EOMI, PERRLA, conjunctiva and sclera clear  NECK: Supple, No JVD  CHEST/LUNG: no wheeze, clear to auscultation bilaterally  HEART: S1 S2; No rubs or gallops, no murmurs appreciated  ABDOMEN: Soft, Nontender, Nondistended; Bowel sounds present  EXTREMITIES:  No clubbing or cyanosis, + Peripheral Pulses,  no edema  PSYCH: AO x 3 appropriate affect  NEUROLOGY: non-focal, motor and sensory systems intact  SKIN: No rashes or lesions    LABS:                        11.5   9.16  )-----------( 156      ( 28 Mar 2020 07:05 )             36.7     03-28    137  |  105  |  43<H>  ----------------------------<  127<H>  4.8   |  19<L>  |  1.75<H>    Ca    8.9      28 Mar 2020 07:01    TPro  5.9<L>  /  Alb  2.8<L>  /  TBili  1.6<H>  /  DBili  x   /  AST  42<H>  /  ALT  35  /  AlkPhos  335<H>  03-28    PT/INR - ( 28 Mar 2020 09:36 )   PT: 13.1 sec;   INR: 1.14 ratio                     All consultant(s) notes reviewed and care discussed with other providers        Contact Number, Dr Johnson [phone]

## 2020-03-28 NOTE — PROGRESS NOTE ADULT - SUBJECTIVE AND OBJECTIVE BOX
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   Baseline creatinine of 1.3mg/dL. He is followed by Dr. Navas     PMH includes spinal stenosis, HTN. He had acute LLE DVT 3 weeks prior to current presentation and started on Eliquis.   Presents with worsening back pain,  weakness, fever of 101 at home.  Pain has been going on for at least 3 months and worsened over the past few weeks unresponsive to Tylenol and aleve   He also has weight loss > 10lbs. Noted to have liver and pancreatic lesions on CT suspicious for metastatic pancreatic cancer.       24 hour events/subjective:  No major events. Pain better controlled on oxycodone and hydromorphone PRN. Appetite is fair. He is voiding well.   No nausea or vomiting. No bowel movement since admission.       Standing Inpatient Medications  enoxaparin Injectable 80 milliGRAM(s) SubCutaneous every 12 hours  influenza   Vaccine 0.5 milliLiter(s) IntraMuscular once  oxyCODONE  ER Tablet 10 milliGRAM(s) Oral every 12 hours  polyethylene glycol 3350 17 Gram(s) Oral daily  tacrolimus 1 milliGRAM(s) Oral <User Schedule>  tacrolimus 2 milliGRAM(s) Oral at bedtime    PRN Inpatient Medications  acetaminophen   Tablet .. 650 milliGRAM(s) Oral every 6 hours PRN  HYDROmorphone  Injectable 0.5 milliGRAM(s) IV Push every 6 hours PRN  oxyCODONE    IR 5 milliGRAM(s) Oral every 4 hours PRN        VITALS/PHYSICAL EXAM  --------------------------------------------------------------------------------  T(C): 36.8 (03-28-20 @ 04:44), Max: 37.4 (03-27-20 @ 21:15)  HR: 68 (03-28-20 @ 04:44) (65 - 68)  BP: 131/79 (03-28-20 @ 04:44) (121/65 - 148/70)  RR: 18 (03-28-20 @ 04:44) (18 - 18)  SpO2: 95% (03-28-20 @ 04:44) (95% - 96%)      03-27-20 @ 07:01  -  03-28-20 @ 07:00  --------------------------------------------------------  IN: 620 mL / OUT: 200 mL / NET: 420 mL      Physical Exam:  Awake and Alert, no acute distress  Lungs are clear   Regular S1 and S2  Abdomen soft and non tender   Extremities no edema         LABS/STUDIES  --------------------------------------------------------------------------------              11.5   9.16  >-----------<  156      [03-28-20 @ 07:05]              36.7     137  |  105  |  43  ----------------------------<  127      [03-28-20 @ 07:01]  4.8   |  19  |  1.75        Ca     8.9     [03-28-20 @ 07:01]    TPro  5.9  /  Alb  2.8  /  TBili  1.6  /  DBili  x   /  AST  42  /  ALT  35  /  AlkPhos  335  [03-28-20 @ 07:01]    PT/INR: PT 13.1 , INR 1.14       [03-28-20 @ 09:36]      Creatinine Trend:  SCr 1.75 [03-28 @ 07:01]  SCr 1.77 [03-27 @ 06:37]  SCr 1.71 [03-26 @ 06:53]  SCr 1.62 [03-25 @ 08:23]  SCr 1.74 [03-24 @ 15:41]    Tacrolimus (), Serum: 13.5 ng/mL (03-27 @ 12:18)  Tacrolimus (), Serum: 8.1 ng/mL (03-26 @ 02:14)  Tacrolimus (), Serum: 6.5 ng/mL (03-25 @ 10:03)    Urinalysis - [03-23-20 @ 23:55]      Color Dark Yellow / Appearance Slightly Turbid / SG 1.031 / pH 5.5      Gluc Negative / Ketone Negative  / Bili Small / Urobili 4 mg/dL       Blood Negative / Protein 30 mg/dL / Leuk Est Moderate / Nitrite Negative      RBC 3 / WBC 12 / Hyaline 7 / Gran  / Sq Epi  / Non Sq Epi 1 / Bacteria Negative      HBsAg Nonreact      [03-25-20 @ 11:54]  HCV 0.10, Nonreact      [03-25-20 @ 11:54]

## 2020-03-29 DIAGNOSIS — C25.9 MALIGNANT NEOPLASM OF PANCREAS, UNSPECIFIED: ICD-10-CM

## 2020-03-29 LAB
CULTURE RESULTS: SIGNIFICANT CHANGE UP
CULTURE RESULTS: SIGNIFICANT CHANGE UP
SPECIMEN SOURCE: SIGNIFICANT CHANGE UP
SPECIMEN SOURCE: SIGNIFICANT CHANGE UP
TACROLIMUS SERPL-MCNC: 11.3 NG/ML — SIGNIFICANT CHANGE UP

## 2020-03-29 PROCEDURE — 99232 SBSQ HOSP IP/OBS MODERATE 35: CPT

## 2020-03-29 RX ORDER — LACTULOSE 10 G/15ML
20 SOLUTION ORAL ONCE
Refills: 0 | Status: COMPLETED | OUTPATIENT
Start: 2020-03-29 | End: 2020-03-29

## 2020-03-29 RX ORDER — TACROLIMUS 5 MG/1
0.5 CAPSULE ORAL EVERY 12 HOURS
Refills: 0 | Status: DISCONTINUED | OUTPATIENT
Start: 2020-03-29 | End: 2020-03-31

## 2020-03-29 RX ORDER — SENNA PLUS 8.6 MG/1
2 TABLET ORAL AT BEDTIME
Refills: 0 | Status: DISCONTINUED | OUTPATIENT
Start: 2020-03-29 | End: 2020-03-31

## 2020-03-29 RX ADMIN — HYDROMORPHONE HYDROCHLORIDE 0.5 MILLIGRAM(S): 2 INJECTION INTRAMUSCULAR; INTRAVENOUS; SUBCUTANEOUS at 13:43

## 2020-03-29 RX ADMIN — OXYCODONE HYDROCHLORIDE 5 MILLIGRAM(S): 5 TABLET ORAL at 12:35

## 2020-03-29 RX ADMIN — OXYCODONE HYDROCHLORIDE 5 MILLIGRAM(S): 5 TABLET ORAL at 09:49

## 2020-03-29 RX ADMIN — OXYCODONE HYDROCHLORIDE 5 MILLIGRAM(S): 5 TABLET ORAL at 13:35

## 2020-03-29 RX ADMIN — POLYETHYLENE GLYCOL 3350 17 GRAM(S): 17 POWDER, FOR SOLUTION ORAL at 11:37

## 2020-03-29 RX ADMIN — TACROLIMUS 0.5 MILLIGRAM(S): 5 CAPSULE ORAL at 20:20

## 2020-03-29 RX ADMIN — GABAPENTIN 100 MILLIGRAM(S): 400 CAPSULE ORAL at 05:58

## 2020-03-29 RX ADMIN — OXYCODONE HYDROCHLORIDE 5 MILLIGRAM(S): 5 TABLET ORAL at 01:25

## 2020-03-29 RX ADMIN — GABAPENTIN 100 MILLIGRAM(S): 400 CAPSULE ORAL at 18:23

## 2020-03-29 RX ADMIN — OXYCODONE HYDROCHLORIDE 10 MILLIGRAM(S): 5 TABLET ORAL at 18:22

## 2020-03-29 RX ADMIN — OXYCODONE HYDROCHLORIDE 5 MILLIGRAM(S): 5 TABLET ORAL at 00:48

## 2020-03-29 RX ADMIN — OXYCODONE HYDROCHLORIDE 5 MILLIGRAM(S): 5 TABLET ORAL at 20:20

## 2020-03-29 RX ADMIN — LACTULOSE 20 GRAM(S): 10 SOLUTION ORAL at 11:36

## 2020-03-29 RX ADMIN — OXYCODONE HYDROCHLORIDE 5 MILLIGRAM(S): 5 TABLET ORAL at 21:06

## 2020-03-29 RX ADMIN — TACROLIMUS 1 MILLIGRAM(S): 5 CAPSULE ORAL at 08:59

## 2020-03-29 RX ADMIN — SENNA PLUS 2 TABLET(S): 8.6 TABLET ORAL at 20:21

## 2020-03-29 RX ADMIN — OXYCODONE HYDROCHLORIDE 10 MILLIGRAM(S): 5 TABLET ORAL at 06:49

## 2020-03-29 RX ADMIN — ENOXAPARIN SODIUM 80 MILLIGRAM(S): 100 INJECTION SUBCUTANEOUS at 05:59

## 2020-03-29 RX ADMIN — OXYCODONE HYDROCHLORIDE 5 MILLIGRAM(S): 5 TABLET ORAL at 08:49

## 2020-03-29 RX ADMIN — OXYCODONE HYDROCHLORIDE 10 MILLIGRAM(S): 5 TABLET ORAL at 05:58

## 2020-03-29 RX ADMIN — HYDROMORPHONE HYDROCHLORIDE 0.5 MILLIGRAM(S): 2 INJECTION INTRAMUSCULAR; INTRAVENOUS; SUBCUTANEOUS at 13:58

## 2020-03-29 NOTE — PROGRESS NOTE ADULT - SUBJECTIVE AND OBJECTIVE BOX
Patient is a 65y old  Male who presents with a chief complaint of fever, back pain (29 Mar 2020 09:46)      SUBJECTIVE / OVERNIGHT EVENTS: overnight events noted  states pain is well controlled    ROS:  Resp: No cough no sputum production  CVS: No chest pain no palpitations no orthopnea  GI: no N/V/D  : no dysuria, no hematuria  Neuro: no weakness no paresthesias  Heme: No petechiae no easy bruising  Msk: No joint pain no swelling  Skin: No rash no itching        MEDICATIONS  (STANDING):  gabapentin 100 milliGRAM(s) Oral every 12 hours  influenza   Vaccine 0.5 milliLiter(s) IntraMuscular once  lactulose Syrup 20 Gram(s) Oral once  oxyCODONE  ER Tablet 10 milliGRAM(s) Oral every 12 hours  polyethylene glycol 3350 17 Gram(s) Oral daily  senna 2 Tablet(s) Oral at bedtime  tacrolimus 0.5 milliGRAM(s) Oral every 12 hours    MEDICATIONS  (PRN):  acetaminophen   Tablet .. 650 milliGRAM(s) Oral every 6 hours PRN Temp greater or equal to 38C (100.4F), Mild Pain (1 - 3)  HYDROmorphone  Injectable 0.5 milliGRAM(s) IV Push every 6 hours PRN Severe Pain (7 - 10)  oxyCODONE    IR 5 milliGRAM(s) Oral every 4 hours PRN Moderate Pain (4 - 6)        CAPILLARY BLOOD GLUCOSE        I&O's Summary    28 Mar 2020 07:01  -  29 Mar 2020 07:00  --------------------------------------------------------  IN: 236 mL / OUT: 0 mL / NET: 236 mL        Vital Signs Last 24 Hrs  T(C): 36.6 (29 Mar 2020 05:24), Max: 36.9 (29 Mar 2020 00:35)  T(F): 97.9 (29 Mar 2020 05:24), Max: 98.4 (29 Mar 2020 00:35)  HR: 63 (29 Mar 2020 05:24) (52 - 63)  BP: 134/73 (29 Mar 2020 05:24) (111/54 - 135/66)  BP(mean): --  RR: 18 (29 Mar 2020 05:24) (18 - 18)  SpO2: 95% (29 Mar 2020 05:24) (95% - 97%)    PHYSICAL EXAM:  GENERAL: in no apparent distress  HEAD:  Atraumatic, Normocephalic  EYES: EOMI, PERRLA, conjunctiva and sclera clear  NECK: Supple, No JVD  CHEST/LUNG: no wheeze, clear   HEART: S1 S2; no murmurs appreciated  ABDOMEN: Soft, Nontender, Nondistended; Bowel sounds present  EXTREMITIES:  No clubbing or cyanosis, + Peripheral Pulses,  no edema  PSYCH: AO x 3 appropriate affect  NEUROLOGY: non-focal, motor and sensory systems intact  SKIN: No rashes or lesions    LABS:                        11.5   9.16  )-----------( 156      ( 28 Mar 2020 07:05 )             36.7     03-28    137  |  105  |  43<H>  ----------------------------<  127<H>  4.8   |  19<L>  |  1.75<H>    Ca    8.9      28 Mar 2020 07:01    TPro  5.9<L>  /  Alb  2.8<L>  /  TBili  1.6<H>  /  DBili  x   /  AST  42<H>  /  ALT  35  /  AlkPhos  335<H>  03-28    PT/INR - ( 28 Mar 2020 09:36 )   PT: 13.1 sec;   INR: 1.14 ratio                     All consultant(s) notes reviewed and care discussed with other providers        Contact Number, Dr Johnson [phone]

## 2020-03-29 NOTE — PROGRESS NOTE ADULT - SUBJECTIVE AND OBJECTIVE BOX
[facility] DIVISION OF KIDNEY DISEASES AND HYPERTENSION -- FOLLOW UP NOTE  --------------------------------------------------------------------------------  Chief Complaint:  kidney transplant, mass, back pain    24 hour events/subjective:  Pts pain is controlled with pain meds.        PAST HISTORY  --------------------------------------------------------------------------------  No significant changes to PMH, PSH, FHx, SHx, unless otherwise noted    ALLERGIES & MEDICATIONS  --------------------------------------------------------------------------------  Allergies    No Known Allergies    Intolerances      Standing Inpatient Medications  enoxaparin Injectable 80 milliGRAM(s) SubCutaneous every 12 hours  gabapentin 100 milliGRAM(s) Oral every 12 hours  influenza   Vaccine 0.5 milliLiter(s) IntraMuscular once  oxyCODONE  ER Tablet 10 milliGRAM(s) Oral every 12 hours  polyethylene glycol 3350 17 Gram(s) Oral daily  tacrolimus 1 milliGRAM(s) Oral <User Schedule>  tacrolimus 2 milliGRAM(s) Oral at bedtime    PRN Inpatient Medications  acetaminophen   Tablet .. 650 milliGRAM(s) Oral every 6 hours PRN  HYDROmorphone  Injectable 0.5 milliGRAM(s) IV Push every 6 hours PRN  oxyCODONE    IR 5 milliGRAM(s) Oral every 4 hours PRN      REVIEW OF SYSTEMS  --------------------------------------------------------------------------------  Gen: No fatigue, fevers/chills, weakness  Skin: No rashes  Head/Eyes/Ears/Mouth: No headache;No sore throat  Respiratory: No dyspnea, cough,   CV: No chest pain, PND, orthopnea  GI: No abdominal pain, diarrhea, constipation, nausea, vomiting  Transplant: No pain  : No increased frequency, dysuria, hematuria, nocturia  MSK: back pain  Neuro: No dizziness/lightheadedness, weakness, seizures, numbness, tingling  Psych: No significant nervousness, anxiety, stress, depression    All other systems were reviewed and are negative, except as noted.    VITALS/PHYSICAL EXAM  --------------------------------------------------------------------------------  T(C): 36.6 (03-29-20 @ 05:24), Max: 36.9 (03-29-20 @ 00:35)  HR: 63 (03-29-20 @ 05:24) (52 - 63)  BP: 134/73 (03-29-20 @ 05:24) (111/54 - 135/66)  RR: 18 (03-29-20 @ 05:24) (18 - 18)  SpO2: 95% (03-29-20 @ 05:24) (95% - 97%)  Wt(kg): --        03-28-20 @ 07:01  -  03-29-20 @ 07:00  --------------------------------------------------------  IN: 236 mL / OUT: 0 mL / NET: 236 mL      Physical Exam:  	Gen: NAD  	HEENT: PERRL, supple neck, clear oropharynx  	Pulm: CTA B/L  	CV: RRR, S1S2; no rub  	Back: No spinal or CVA tenderness; no sacral edema  	Abd: +BS, soft, nontender/nondistended                      Transplant: No tenderness, swelling, RLQ transplant  	: No suprapubic tenderness  	UE: Warm, FROM; no edema; no asterixis  	LE: Warm, FROM; no edema  	Neuro: No focal deficits  	Psych: Normal affect and mood  	Skin: Warm, without rashes      LABS/STUDIES  --------------------------------------------------------------------------------              11.5   9.16  >-----------<  156      [03-28-20 @ 07:05]              36.7     137  |  105  |  43  ----------------------------<  127      [03-28-20 @ 07:01]  4.8   |  19  |  1.75        Ca     8.9     [03-28-20 @ 07:01]    TPro  5.9  /  Alb  2.8  /  TBili  1.6  /  DBili  x   /  AST  42  /  ALT  35  /  AlkPhos  335  [03-28-20 @ 07:01]    PT/INR: PT 13.1 , INR 1.14       [03-28-20 @ 09:36]      Creatinine Trend:  SCr 1.75 [03-28 @ 07:01]  SCr 1.77 [03-27 @ 06:37]  SCr 1.71 [03-26 @ 06:53]  SCr 1.62 [03-25 @ 08:23]  SCr 1.74 [03-24 @ 15:41]    Tacrolimus (), Serum: 11.3 ng/mL (03-28 @ 11:21)  Tacrolimus (), Serum: 13.5 ng/mL (03-27 @ 12:18)  Tacrolimus (), Serum: 8.1 ng/mL (03-26 @ 02:14)  Tacrolimus (), Serum: 6.5 ng/mL (03-25 @ 10:03)            Urinalysis - [03-23-20 @ 23:55]      Color Dark Yellow / Appearance Slightly Turbid / SG 1.031 / pH 5.5      Gluc Negative / Ketone Negative  / Bili Small / Urobili 4 mg/dL       Blood Negative / Protein 30 mg/dL / Leuk Est Moderate / Nitrite Negative      RBC 3 / WBC 12 / Hyaline 7 / Gran  / Sq Epi  / Non Sq Epi 1 / Bacteria Negative        HBsAg Nonreact      [03-25-20 @ 11:54]  HCV 0.10, Nonreact      [03-25-20 @ 11:54]

## 2020-03-29 NOTE — PROGRESS NOTE ADULT - ATTENDING COMMENTS
discussed with patient in detail, expresses understanding of treatment plans.  d/w daughter Lynne over the phone in detail

## 2020-03-29 NOTE — PROGRESS NOTE ADULT - PROBLEM SELECTOR PLAN 3
states improved   will continue Oxy IR as needed q 4  continue Oxy ER 10 BID for background pain relief  continue gabapentin low dose  start bowel regimen

## 2020-03-30 ENCOUNTER — RESULT REVIEW (OUTPATIENT)
Age: 65
End: 2020-03-30

## 2020-03-30 LAB
ALBUMIN SERPL ELPH-MCNC: 2.9 G/DL — LOW (ref 3.3–5)
ALP SERPL-CCNC: 341 U/L — HIGH (ref 40–120)
ALT FLD-CCNC: 44 U/L — SIGNIFICANT CHANGE UP (ref 10–45)
ANION GAP SERPL CALC-SCNC: 14 MMOL/L — SIGNIFICANT CHANGE UP (ref 5–17)
AST SERPL-CCNC: 54 U/L — HIGH (ref 10–40)
BILIRUB SERPL-MCNC: 1.6 MG/DL — HIGH (ref 0.2–1.2)
BLD GP AB SCN SERPL QL: NEGATIVE — SIGNIFICANT CHANGE UP
BUN SERPL-MCNC: 35 MG/DL — HIGH (ref 7–23)
CALCIUM SERPL-MCNC: 9.1 MG/DL — SIGNIFICANT CHANGE UP (ref 8.4–10.5)
CHLORIDE SERPL-SCNC: 104 MMOL/L — SIGNIFICANT CHANGE UP (ref 96–108)
CO2 SERPL-SCNC: 20 MMOL/L — LOW (ref 22–31)
CREAT SERPL-MCNC: 1.55 MG/DL — HIGH (ref 0.5–1.3)
GLUCOSE BLDC GLUCOMTR-MCNC: 108 MG/DL — HIGH (ref 70–99)
GLUCOSE SERPL-MCNC: 123 MG/DL — HIGH (ref 70–99)
HCT VFR BLD CALC: 39.9 % — SIGNIFICANT CHANGE UP (ref 39–50)
HGB BLD-MCNC: 12.3 G/DL — LOW (ref 13–17)
MCHC RBC-ENTMCNC: 26 PG — LOW (ref 27–34)
MCHC RBC-ENTMCNC: 30.8 GM/DL — LOW (ref 32–36)
MCV RBC AUTO: 84.4 FL — SIGNIFICANT CHANGE UP (ref 80–100)
NRBC # BLD: 0 /100 WBCS — SIGNIFICANT CHANGE UP (ref 0–0)
PLATELET # BLD AUTO: 164 K/UL — SIGNIFICANT CHANGE UP (ref 150–400)
POTASSIUM SERPL-MCNC: 5.1 MMOL/L — SIGNIFICANT CHANGE UP (ref 3.5–5.3)
POTASSIUM SERPL-SCNC: 5.1 MMOL/L — SIGNIFICANT CHANGE UP (ref 3.5–5.3)
PROT SERPL-MCNC: 6.3 G/DL — SIGNIFICANT CHANGE UP (ref 6–8.3)
RBC # BLD: 4.73 M/UL — SIGNIFICANT CHANGE UP (ref 4.2–5.8)
RBC # FLD: 14.2 % — SIGNIFICANT CHANGE UP (ref 10.3–14.5)
RH IG SCN BLD-IMP: POSITIVE — SIGNIFICANT CHANGE UP
SODIUM SERPL-SCNC: 138 MMOL/L — SIGNIFICANT CHANGE UP (ref 135–145)
WBC # BLD: 10.43 K/UL — SIGNIFICANT CHANGE UP (ref 3.8–10.5)
WBC # FLD AUTO: 10.43 K/UL — SIGNIFICANT CHANGE UP (ref 3.8–10.5)

## 2020-03-30 PROCEDURE — 88341 IMHCHEM/IMCYTCHM EA ADD ANTB: CPT | Mod: 26

## 2020-03-30 PROCEDURE — 88173 CYTOPATH EVAL FNA REPORT: CPT | Mod: 26

## 2020-03-30 PROCEDURE — 88305 TISSUE EXAM BY PATHOLOGIST: CPT | Mod: 26

## 2020-03-30 PROCEDURE — 88342 IMHCHEM/IMCYTCHM 1ST ANTB: CPT | Mod: 26,59

## 2020-03-30 PROCEDURE — 47000 NEEDLE BIOPSY OF LIVER PERQ: CPT

## 2020-03-30 PROCEDURE — 88307 TISSUE EXAM BY PATHOLOGIST: CPT | Mod: 26

## 2020-03-30 PROCEDURE — 76942 ECHO GUIDE FOR BIOPSY: CPT | Mod: 26

## 2020-03-30 RX ORDER — OXYCODONE HYDROCHLORIDE 5 MG/1
20 TABLET ORAL EVERY 12 HOURS
Refills: 0 | Status: DISCONTINUED | OUTPATIENT
Start: 2020-03-30 | End: 2020-03-31

## 2020-03-30 RX ORDER — OXYCODONE HYDROCHLORIDE 5 MG/1
10 TABLET ORAL EVERY 4 HOURS
Refills: 0 | Status: DISCONTINUED | OUTPATIENT
Start: 2020-03-30 | End: 2020-03-31

## 2020-03-30 RX ADMIN — OXYCODONE HYDROCHLORIDE 10 MILLIGRAM(S): 5 TABLET ORAL at 16:13

## 2020-03-30 RX ADMIN — GABAPENTIN 100 MILLIGRAM(S): 400 CAPSULE ORAL at 05:37

## 2020-03-30 RX ADMIN — TACROLIMUS 0.5 MILLIGRAM(S): 5 CAPSULE ORAL at 05:37

## 2020-03-30 RX ADMIN — OXYCODONE HYDROCHLORIDE 5 MILLIGRAM(S): 5 TABLET ORAL at 12:23

## 2020-03-30 RX ADMIN — GABAPENTIN 100 MILLIGRAM(S): 400 CAPSULE ORAL at 18:07

## 2020-03-30 RX ADMIN — OXYCODONE HYDROCHLORIDE 10 MILLIGRAM(S): 5 TABLET ORAL at 20:35

## 2020-03-30 RX ADMIN — SENNA PLUS 2 TABLET(S): 8.6 TABLET ORAL at 21:31

## 2020-03-30 RX ADMIN — HYDROMORPHONE HYDROCHLORIDE 0.5 MILLIGRAM(S): 2 INJECTION INTRAMUSCULAR; INTRAVENOUS; SUBCUTANEOUS at 10:05

## 2020-03-30 RX ADMIN — OXYCODONE HYDROCHLORIDE 5 MILLIGRAM(S): 5 TABLET ORAL at 08:15

## 2020-03-30 RX ADMIN — TACROLIMUS 0.5 MILLIGRAM(S): 5 CAPSULE ORAL at 18:07

## 2020-03-30 RX ADMIN — POLYETHYLENE GLYCOL 3350 17 GRAM(S): 17 POWDER, FOR SOLUTION ORAL at 12:15

## 2020-03-30 RX ADMIN — OXYCODONE HYDROCHLORIDE 5 MILLIGRAM(S): 5 TABLET ORAL at 07:15

## 2020-03-30 RX ADMIN — OXYCODONE HYDROCHLORIDE 10 MILLIGRAM(S): 5 TABLET ORAL at 06:21

## 2020-03-30 RX ADMIN — OXYCODONE HYDROCHLORIDE 20 MILLIGRAM(S): 5 TABLET ORAL at 18:07

## 2020-03-30 RX ADMIN — Medication 10 MILLIGRAM(S): at 16:18

## 2020-03-30 RX ADMIN — OXYCODONE HYDROCHLORIDE 10 MILLIGRAM(S): 5 TABLET ORAL at 05:36

## 2020-03-30 NOTE — PROGRESS NOTE ADULT - SUBJECTIVE AND OBJECTIVE BOX
Vascular & Interventional Radiology Post-Procedure Note    Pre-Procedure Diagnosis: Hepatic Lesions  Post-Procedure Diagnosis: Same as pre.  Indications for Procedure: Concern for metastatic pancreatic cancer    : Mallika BRICEÑO, Naresh BRICEÑO    Procedure Details/Findings: Successful hepatic lesion biopsy. Hemostasis with gelblock.     Complications: None  Estimated Blood Loss: Minimal  Specimen: Sent with Cytopathology Technologist  Contrast: N/A  Sedation: MAC  Patient Condition/Disposition: Stable. PACU then Floor Vascular & Interventional Radiology Post-Procedure Note    Pre-Procedure Diagnosis: Hepatic Lesions  Post-Procedure Diagnosis: Same as pre.  Indications for Procedure: Concern for metastatic pancreatic cancer    : Mallika BRICEÑO, Naresh BRICEÑO    Procedure Details/Findings: Successful hepatic lesion biopsy. Hemostasis with gelblock.     Complications: None  Estimated Blood Loss: Minimal  Specimen: Sent with Cytopathology Technologist  Contrast: N/A  Sedation: MAC  Patient Condition/Disposition: Stable. PACU then Floor. Bedrest for 4 hours.

## 2020-03-30 NOTE — DIETITIAN INITIAL EVALUATION ADULT. - PHYSICAL APPEARANCE
Nutrition focused physical exam deferred at this time as pt with pain./other (specify) Ht: 70 inches Wt: 158 pounds BMI: 22.7 kg/m2 IBW: 166 (+/-10%) 95.2 %IBW  No noted edema as per flow sheets.   Skin: no noted pressure injuries as per documentation.

## 2020-03-30 NOTE — DIETITIAN INITIAL EVALUATION ADULT. - ENERGY NEEDS
Pertinent information as per chart: Pt 66 y/o M with PMH: left kidney transplant (2014), polycystic kidney disease, HTN, hx of spinal stenosis, admitted with complaints of acute on chronic upper back pain, fever, found to have mild transaminitis, sepsis 2/2 UTI, CRYSTAL, liver and pancreatic lesions concerning for metastatic cancer - S/P biopsy today (03/30).

## 2020-03-30 NOTE — DIETITIAN INITIAL EVALUATION ADULT. - CONTINUE CURRENT NUTRITION CARE PLAN
1. Recommend resume regular diet as medically feasible. Will continue to monitor and adjust as needed. 2. Recommend Ensure Enlive 2x daily (700 allyson and 40 gm protein) to optimize kcal and protein intake. Spoke to NP./yes

## 2020-03-30 NOTE — PROGRESS NOTE ADULT - SUBJECTIVE AND OBJECTIVE BOX
Vascular & Interventional Radiology Pre-Procedure Note    Procedure Name: Hepatic Lesion Biopsy    HPI: 66y/o male w/ pmhx of L kidney transplant 2014, polycystic kidney disease, HTN, hx of spinal stenosis presenting with hepatic metastatic disease likely secondary to pancreatic cancer.     Allergies: NKDA    Medications:    enoxaparin Injectable: 80 milliGRAM(s) SubCutaneous (03-29 @ 05:59)    Data:    T(C): 36.8  HR: 60  BP: 124/71  RR: 18  SpO2: 94%    -WBC 9.16 / HgB 11.5 / Hct 36.7 / Plt 156  -Na 137 / Cl 105 / BUN 43 / Glucose 127  -K 4.8 / CO2 19 / Cr 1.75  -ALT 35 / Alk Phos 335 / T.Bili 1.6  -INR1.14    Imaging: MRI Abdomen 03/25/2020    Plan:   -65y Male presents for hepatic lesion biopsy.   -Risks/Benefits/alternatives explained with the patient and witnessed informed consent obtained. Vascular & Interventional Radiology Pre-Procedure Note    Procedure Name: Hepatic Lesion Biopsy    HPI: 64y/o male w/ pmhx of L kidney transplant 2014, polycystic kidney disease, HTN, hx of spinal stenosis presenting with hepatic metastatic disease likely secondary to pancreatic cancer.     Allergies: NKDA    Medications:    enoxaparin Injectable: 80 milliGRAM(s) SubCutaneous (03-29 @ 05:59)    Data:    T(C): 36.8  HR: 60  BP: 124/71  RR: 18  SpO2: 94%    -WBC 9.16 / HgB 11.5 / Hct 36.7 / Plt 156  -Na 137 / Cl 105 / BUN 43 / Glucose 127  -K 4.8 / CO2 19 / Cr 1.75  -ALT 35 / Alk Phos 335 / T.Bili 1.6  -INR1.14    Imaging: MRI Abdomen 03/25/2020    Plan:   -65y Male presents for hepatic lesion biopsy.     -Risks/Benefits/alternatives explained with the patient and witnessed informed consent obtained.

## 2020-03-30 NOTE — DIETITIAN INITIAL EVALUATION ADULT. - NS FNS WEIGHT CHANGE REASON
Pt reports 30 pounds weight loss x 3 months PTA due to poor PO intake, from 190 to 160 pounds. Weight as per flow sheets (03/23) 158 pounds - will continue to monitor.

## 2020-03-30 NOTE — DIETITIAN INITIAL EVALUATION ADULT. - ADD RECOMMEND
1. Will continue to monitor PO intake, weight, labs, skin, GI status, diet. 2. Once applicable, encourage PO intake and obtain food preferences. 3. Provided recommendations to optimize PO and protein intake and help with constipation - pt made aware RD remains available. 4. Malnutrition notification placed in chart - spoke to NP.

## 2020-03-30 NOTE — CHART NOTE - NSCHARTNOTEFT_GEN_A_CORE
Upon Nutritional Assessment by the Registered Dietitian your patient was determined to meet criteria / has evidence of the following diagnosis/diagnoses:          [ ]  Mild Protein Calorie Malnutrition        [ ]  Moderate Protein Calorie Malnutrition        [x] Severe Protein Calorie Malnutrition        [ ] Unspecified Protein Calorie Malnutrition        [ ] Underweight / BMI <19        [ ] Morbid Obesity / BMI > 40      Findings as based on:  [x] Comprehensive nutrition assessment   [ ] Nutrition Focused Physical Exam  [x] Other: PO intake <75% and 15.8% weight loss x 3 months.     Nutrition Plan/Recommendations:    1. Recommend resume regular diet as medically feasible. Will continue to monitor and adjust as needed.   2. Recommend Ensure Enlive 2x daily (700 allyson and 40 gm protein) to optimize kcal and protein intake.  3. Once applicable, encourage PO intake and obtain food preferences.   4. Provided recommendations to optimize PO and protein intake and help with constipation.     RD remains available  Kristyn Soares MS, RDN CDN #[id number]    PROVIDER Section:     By signing this assessment you are acknowledging and agree with the diagnosis/diagnoses assigned by the Registered Dietitian    Comments:

## 2020-03-30 NOTE — PROGRESS NOTE ADULT - SUBJECTIVE AND OBJECTIVE BOX
Patient is a 65y old  Male who presents with a chief complaint of fever, back pain (30 Mar 2020 10:15)      SUBJECTIVE / OVERNIGHT EVENTS: overnight events noted    ROS:  Resp: No cough no sputum production  CVS: No chest pain no palpitations no orthopnea  GI: no N/V/D  : no dysuria, no hematuria  Neuro: no weakness no paresthesias  Heme: No petechiae no easy bruising  Msk: No joint pain no swelling  Skin: No rash no itching        MEDICATIONS  (STANDING):  gabapentin 100 milliGRAM(s) Oral every 12 hours  influenza   Vaccine 0.5 milliLiter(s) IntraMuscular once  oxyCODONE  ER Tablet 10 milliGRAM(s) Oral every 12 hours  polyethylene glycol 3350 17 Gram(s) Oral daily  senna 2 Tablet(s) Oral at bedtime  tacrolimus 0.5 milliGRAM(s) Oral every 12 hours    MEDICATIONS  (PRN):  acetaminophen   Tablet .. 650 milliGRAM(s) Oral every 6 hours PRN Temp greater or equal to 38C (100.4F), Mild Pain (1 - 3)  HYDROmorphone  Injectable 0.5 milliGRAM(s) IV Push every 6 hours PRN Severe Pain (7 - 10)  oxyCODONE    IR 5 milliGRAM(s) Oral every 4 hours PRN Moderate Pain (4 - 6)        CAPILLARY BLOOD GLUCOSE      POCT Blood Glucose.: 108 mg/dL (30 Mar 2020 13:33)    I&O's Summary    29 Mar 2020 07:01  -  30 Mar 2020 07:00  --------------------------------------------------------  IN: 240 mL / OUT: 0 mL / NET: 240 mL        Vital Signs Last 24 Hrs  T(C): 36.8 (30 Mar 2020 13:16), Max: 37.1 (30 Mar 2020 00:21)  T(F): 98.2 (30 Mar 2020 13:16), Max: 98.8 (30 Mar 2020 00:21)  HR: 54 (30 Mar 2020 13:16) (54 - 81)  BP: 152/75 (30 Mar 2020 13:16) (114/66 - 152/75)  BP(mean): --  RR: 18 (30 Mar 2020 13:16) (18 - 18)  SpO2: 95% (30 Mar 2020 13:16) (94% - 97%)    PHYSICAL EXAM:  GENERAL: in no apparent distress  HEAD:  Atraumatic, Normocephalic  EYES: EOMI, PERRLA, conjunctiva and sclera clear  NECK: Supple, No JVD  CHEST/LUNG: no wheeze, clear   HEART: S1 S2; no murmurs appreciated  ABDOMEN: Soft, Nontender, Nondistended; Bowel sounds present  EXTREMITIES:  No clubbing or cyanosis, + Peripheral Pulses,  no edema  PSYCH: AO x 3 appropriate affect  NEUROLOGY: non-focal, motor and sensory systems intact  SKIN: No rashes or lesions    LABS:                        12.3   10.43 )-----------( 164      ( 30 Mar 2020 07:42 )             39.9     03-30    138  |  104  |  35<H>  ----------------------------<  123<H>  5.1   |  20<L>  |  1.55<H>    Ca    9.1      30 Mar 2020 07:40    TPro  6.3  /  Alb  2.9<L>  /  TBili  1.6<H>  /  DBili  x   /  AST  54<H>  /  ALT  44  /  AlkPhos  341<H>  03-30                All consultant(s) notes reviewed and care discussed with other providers        Contact Number, Dr Johnson [phone]

## 2020-03-30 NOTE — DIETITIAN INITIAL EVALUATION ADULT. - OTHER INFO
Pt reports poor appetite and PO intake x 3 months PTA due to severe back pain, states consuming "almost nothing". Confirms NKFA. Pt reports not following any type of diet or restriction at home. Pt reports not taking any vitamins or nutritional supplements PTA.     Pt NPO today for biopsy - reports previously with improved appetite and PO intake during hospitalization. Noted 75% PO intake on (03/26) as per flow sheets. Offered nutritional supplement once diet is resumed - pt agreed to try Ensure Enlive, spoke to NP. Pt denies difficulty chewing/swallowing. Pt denies nausea or vomiting. Reports constipation with last BM 1 week ago (03/23) - pt on bowel regimen as per chart, refused prunes or prune juice.     Provided recommendations to optimize PO and protein intake and help with constipation, recommended small frequent meals by ordering nutrient-dense snacks and leaving non-perishable food away from tray for later consumption during the day or between meals, to start with protein, and sips of supplement throughout the day; reviewed foods with protein, nutrient-dense snacks, and menu order procedures in hospital; recommended fresh fruits and vegetables, whole grains, and good hydration.

## 2020-03-30 NOTE — PROGRESS NOTE ADULT - PROBLEM SELECTOR PLAN 3
will continue Oxy IR as needed q 4  continue Oxy ER 10 BID for background pain relief  continue gabapentin low dose  start bowel regimen

## 2020-03-31 ENCOUNTER — TRANSCRIPTION ENCOUNTER (OUTPATIENT)
Age: 65
End: 2020-03-31

## 2020-03-31 VITALS
DIASTOLIC BLOOD PRESSURE: 79 MMHG | SYSTOLIC BLOOD PRESSURE: 142 MMHG | RESPIRATION RATE: 18 BRPM | OXYGEN SATURATION: 95 % | HEART RATE: 70 BPM | TEMPERATURE: 99 F

## 2020-03-31 LAB
ALBUMIN SERPL ELPH-MCNC: 2.8 G/DL — LOW (ref 3.3–5)
ALP SERPL-CCNC: 321 U/L — HIGH (ref 40–120)
ALT FLD-CCNC: 49 U/L — HIGH (ref 10–45)
ANION GAP SERPL CALC-SCNC: 9 MMOL/L — SIGNIFICANT CHANGE UP (ref 5–17)
AST SERPL-CCNC: 77 U/L — HIGH (ref 10–40)
BILIRUB SERPL-MCNC: 1.6 MG/DL — HIGH (ref 0.2–1.2)
BUN SERPL-MCNC: 33 MG/DL — HIGH (ref 7–23)
CALCIUM SERPL-MCNC: 8.7 MG/DL — SIGNIFICANT CHANGE UP (ref 8.4–10.5)
CHLORIDE SERPL-SCNC: 103 MMOL/L — SIGNIFICANT CHANGE UP (ref 96–108)
CO2 SERPL-SCNC: 24 MMOL/L — SIGNIFICANT CHANGE UP (ref 22–31)
CREAT SERPL-MCNC: 1.62 MG/DL — HIGH (ref 0.5–1.3)
GLUCOSE SERPL-MCNC: 130 MG/DL — HIGH (ref 70–99)
HCT VFR BLD CALC: 37.7 % — LOW (ref 39–50)
HGB BLD-MCNC: 11.6 G/DL — LOW (ref 13–17)
MCHC RBC-ENTMCNC: 26.2 PG — LOW (ref 27–34)
MCHC RBC-ENTMCNC: 30.8 GM/DL — LOW (ref 32–36)
MCV RBC AUTO: 85.1 FL — SIGNIFICANT CHANGE UP (ref 80–100)
NRBC # BLD: 0 /100 WBCS — SIGNIFICANT CHANGE UP (ref 0–0)
PLATELET # BLD AUTO: 155 K/UL — SIGNIFICANT CHANGE UP (ref 150–400)
POTASSIUM SERPL-MCNC: 5 MMOL/L — SIGNIFICANT CHANGE UP (ref 3.5–5.3)
POTASSIUM SERPL-SCNC: 5 MMOL/L — SIGNIFICANT CHANGE UP (ref 3.5–5.3)
PROT SERPL-MCNC: 6.2 G/DL — SIGNIFICANT CHANGE UP (ref 6–8.3)
RBC # BLD: 4.43 M/UL — SIGNIFICANT CHANGE UP (ref 4.2–5.8)
RBC # FLD: 14.2 % — SIGNIFICANT CHANGE UP (ref 10.3–14.5)
SODIUM SERPL-SCNC: 136 MMOL/L — SIGNIFICANT CHANGE UP (ref 135–145)
TACROLIMUS SERPL-MCNC: 5.8 NG/ML — SIGNIFICANT CHANGE UP
WBC # BLD: 9.67 K/UL — SIGNIFICANT CHANGE UP (ref 3.8–10.5)
WBC # FLD AUTO: 9.67 K/UL — SIGNIFICANT CHANGE UP (ref 3.8–10.5)

## 2020-03-31 PROCEDURE — 88341 IMHCHEM/IMCYTCHM EA ADD ANTB: CPT

## 2020-03-31 PROCEDURE — 93005 ELECTROCARDIOGRAM TRACING: CPT

## 2020-03-31 PROCEDURE — 71045 X-RAY EXAM CHEST 1 VIEW: CPT

## 2020-03-31 PROCEDURE — 76776 US EXAM K TRANSPL W/DOPPLER: CPT

## 2020-03-31 PROCEDURE — 87040 BLOOD CULTURE FOR BACTERIA: CPT

## 2020-03-31 PROCEDURE — 82565 ASSAY OF CREATININE: CPT

## 2020-03-31 PROCEDURE — 83605 ASSAY OF LACTIC ACID: CPT

## 2020-03-31 PROCEDURE — 71250 CT THORAX DX C-: CPT

## 2020-03-31 PROCEDURE — 47000 NEEDLE BIOPSY OF LIVER PERQ: CPT

## 2020-03-31 PROCEDURE — 82977 ASSAY OF GGT: CPT

## 2020-03-31 PROCEDURE — 86901 BLOOD TYPING SEROLOGIC RH(D): CPT

## 2020-03-31 PROCEDURE — 84295 ASSAY OF SERUM SODIUM: CPT

## 2020-03-31 PROCEDURE — 88173 CYTOPATH EVAL FNA REPORT: CPT

## 2020-03-31 PROCEDURE — 93970 EXTREMITY STUDY: CPT

## 2020-03-31 PROCEDURE — 88172 CYTP DX EVAL FNA 1ST EA SITE: CPT

## 2020-03-31 PROCEDURE — 99238 HOSP IP/OBS DSCHRG MGMT 30/<: CPT

## 2020-03-31 PROCEDURE — 80048 BASIC METABOLIC PNL TOTAL CA: CPT

## 2020-03-31 PROCEDURE — 83735 ASSAY OF MAGNESIUM: CPT

## 2020-03-31 PROCEDURE — 85610 PROTHROMBIN TIME: CPT

## 2020-03-31 PROCEDURE — 85730 THROMBOPLASTIN TIME PARTIAL: CPT

## 2020-03-31 PROCEDURE — 76942 ECHO GUIDE FOR BIOPSY: CPT

## 2020-03-31 PROCEDURE — 87086 URINE CULTURE/COLONY COUNT: CPT

## 2020-03-31 PROCEDURE — A9585: CPT

## 2020-03-31 PROCEDURE — 74182 MRI ABDOMEN W/CONTRAST: CPT

## 2020-03-31 PROCEDURE — 88307 TISSUE EXAM BY PATHOLOGIST: CPT

## 2020-03-31 PROCEDURE — 82330 ASSAY OF CALCIUM: CPT

## 2020-03-31 PROCEDURE — 86900 BLOOD TYPING SEROLOGIC ABO: CPT

## 2020-03-31 PROCEDURE — 85027 COMPLETE CBC AUTOMATED: CPT

## 2020-03-31 PROCEDURE — U0001: CPT

## 2020-03-31 PROCEDURE — 85014 HEMATOCRIT: CPT

## 2020-03-31 PROCEDURE — 84132 ASSAY OF SERUM POTASSIUM: CPT

## 2020-03-31 PROCEDURE — 82435 ASSAY OF BLOOD CHLORIDE: CPT

## 2020-03-31 PROCEDURE — 74176 CT ABD & PELVIS W/O CONTRAST: CPT

## 2020-03-31 PROCEDURE — 86850 RBC ANTIBODY SCREEN: CPT

## 2020-03-31 PROCEDURE — 88305 TISSUE EXAM BY PATHOLOGIST: CPT

## 2020-03-31 PROCEDURE — 82803 BLOOD GASES ANY COMBINATION: CPT

## 2020-03-31 PROCEDURE — 99232 SBSQ HOSP IP/OBS MODERATE 35: CPT

## 2020-03-31 PROCEDURE — 82962 GLUCOSE BLOOD TEST: CPT

## 2020-03-31 PROCEDURE — 88342 IMHCHEM/IMCYTCHM 1ST ANTB: CPT

## 2020-03-31 PROCEDURE — 80197 ASSAY OF TACROLIMUS: CPT

## 2020-03-31 PROCEDURE — 84100 ASSAY OF PHOSPHORUS: CPT

## 2020-03-31 PROCEDURE — 80053 COMPREHEN METABOLIC PANEL: CPT

## 2020-03-31 PROCEDURE — 81001 URINALYSIS AUTO W/SCOPE: CPT

## 2020-03-31 PROCEDURE — 80074 ACUTE HEPATITIS PANEL: CPT

## 2020-03-31 PROCEDURE — C1876: CPT

## 2020-03-31 PROCEDURE — 96374 THER/PROPH/DIAG INJ IV PUSH: CPT

## 2020-03-31 PROCEDURE — 82947 ASSAY GLUCOSE BLOOD QUANT: CPT

## 2020-03-31 PROCEDURE — 99285 EMERGENCY DEPT VISIT HI MDM: CPT | Mod: 25

## 2020-03-31 RX ORDER — OXYCODONE HYDROCHLORIDE 5 MG/1
1 TABLET ORAL
Qty: 0 | Refills: 0 | DISCHARGE
Start: 2020-03-31

## 2020-03-31 RX ORDER — OXYCODONE HYDROCHLORIDE 5 MG/1
1 TABLET ORAL
Qty: 20 | Refills: 0
Start: 2020-03-31 | End: 2020-04-09

## 2020-03-31 RX ORDER — OXYCODONE HYDROCHLORIDE 5 MG/1
1 TABLET ORAL
Qty: 60 | Refills: 0
Start: 2020-03-31 | End: 2020-04-09

## 2020-03-31 RX ORDER — MYCOPHENOLATE MOFETIL 250 MG/1
1 CAPSULE ORAL
Qty: 0 | Refills: 0 | DISCHARGE

## 2020-03-31 RX ORDER — GABAPENTIN 400 MG/1
1 CAPSULE ORAL
Qty: 60 | Refills: 0
Start: 2020-03-31 | End: 2020-04-29

## 2020-03-31 RX ORDER — TACROLIMUS 5 MG/1
1 CAPSULE ORAL
Qty: 0 | Refills: 0 | DISCHARGE

## 2020-03-31 RX ORDER — POLYETHYLENE GLYCOL 3350 17 G/17G
17 POWDER, FOR SOLUTION ORAL
Qty: 0 | Refills: 0 | DISCHARGE
Start: 2020-03-31

## 2020-03-31 RX ORDER — SENNA PLUS 8.6 MG/1
2 TABLET ORAL
Qty: 0 | Refills: 0 | DISCHARGE
Start: 2020-03-31

## 2020-03-31 RX ORDER — APIXABAN 2.5 MG/1
1 TABLET, FILM COATED ORAL
Qty: 180 | Refills: 0
Start: 2020-03-31 | End: 2020-06-28

## 2020-03-31 RX ORDER — ACETAMINOPHEN 500 MG
2 TABLET ORAL
Qty: 0 | Refills: 0 | DISCHARGE
Start: 2020-03-31

## 2020-03-31 RX ORDER — ACETAMINOPHEN 500 MG
2 TABLET ORAL
Qty: 0 | Refills: 0 | DISCHARGE

## 2020-03-31 RX ORDER — NIFEDIPINE 30 MG
1 TABLET, EXTENDED RELEASE 24 HR ORAL
Qty: 0 | Refills: 0 | DISCHARGE

## 2020-03-31 RX ORDER — TACROLIMUS 5 MG/1
1 CAPSULE ORAL
Qty: 0 | Refills: 0 | DISCHARGE
Start: 2020-03-31

## 2020-03-31 RX ADMIN — GABAPENTIN 100 MILLIGRAM(S): 400 CAPSULE ORAL at 06:07

## 2020-03-31 RX ADMIN — TACROLIMUS 0.5 MILLIGRAM(S): 5 CAPSULE ORAL at 06:08

## 2020-03-31 RX ADMIN — OXYCODONE HYDROCHLORIDE 10 MILLIGRAM(S): 5 TABLET ORAL at 13:23

## 2020-03-31 RX ADMIN — OXYCODONE HYDROCHLORIDE 10 MILLIGRAM(S): 5 TABLET ORAL at 09:19

## 2020-03-31 RX ADMIN — POLYETHYLENE GLYCOL 3350 17 GRAM(S): 17 POWDER, FOR SOLUTION ORAL at 11:58

## 2020-03-31 RX ADMIN — OXYCODONE HYDROCHLORIDE 20 MILLIGRAM(S): 5 TABLET ORAL at 06:07

## 2020-03-31 RX ADMIN — OXYCODONE HYDROCHLORIDE 10 MILLIGRAM(S): 5 TABLET ORAL at 00:55

## 2020-03-31 RX ADMIN — OXYCODONE HYDROCHLORIDE 10 MILLIGRAM(S): 5 TABLET ORAL at 04:32

## 2020-03-31 NOTE — DISCHARGE NOTE PROVIDER - CARE PROVIDER_API CALL
Grisel Sharif (DO)  [address] [facility]  [address] [facility], [address]  [place] [address]  Phone: [phone]  Fax: [phone]  Follow Up Time:     [facility],   [address] [place] [id number]  Phone: [phone]  Fax: (   )    -  Follow Up Time:

## 2020-03-31 NOTE — DISCHARGE NOTE PROVIDER - HOSPITAL COURSE
64y/o male w/ pmhx of L kidney transplant 2014, polycystic kidney disease, HTN, hx of spinal stenosis presenting with complaints of acute on chronic upper back pain now for one week. He states he has spinal stenosis, and has been suffering from back pain for the last 5 months, which has become progressively worse this past week, and with associated fevers of 101 at home. This back pain is sharp, 6-7/10 pain at its worst, does not get worse with movement. No associated loss of sensation, fecal/urine incontinence, numbness/tingling. He states he has some intermittent SOB at times and also notes loss of appetite, weight loss of 25 pounds in the last 2.5 months. Pancreatic cancer metastasized to liver.  Plan: for biopsy    Pt is s/p liver biopsy, will follow up with GI for the results and await further recommendations.  Case discussed with Dr. Johnson, who cleared the patient for discharge.

## 2020-03-31 NOTE — DISCHARGE NOTE PROVIDER - CARE PROVIDERS DIRECT ADDRESSES
[email or web address]

## 2020-03-31 NOTE — DISCHARGE NOTE PROVIDER - NSDCMRMEDTOKEN_GEN_ALL_CORE_FT
acetaminophen 325 mg oral tablet: 2 tab(s) orally every 6 hours, As needed, Temp greater or equal to 38C (100.4F), Mild Pain (1 - 3)  allopurinol 100 mg oral tablet: orally 2 times a day  atorvastatin 10 mg oral tablet: 1 tab(s) orally once a day (at bedtime)  bisacodyl 10 mg rectal suppository: 1 suppository(ies) rectal once a day, As needed, Constipation  Eliquis 5 mg oral tablet: 1 tab(s) orally 2 times a day   restart Wednesday evening  gabapentin 100 mg oral capsule: 1 cap(s) orally every 12 hours  lidocaine 5% topical film: Apply topically to affected area once a day (12 hours on, 12 hours off)  oxyCODONE 10 mg oral tablet: 1 tab(s) orally every 4 hours, As needed, Moderate Pain (4 - 6) MDD:6  oxyCODONE 20 mg oral tablet, extended release: 1 tab(s) orally every 12 hours MDD:2  polyethylene glycol 3350 oral powder for reconstitution: 17 gram(s) orally once a day  senna oral tablet: 2 tab(s) orally once a day (at bedtime)  tacrolimus 0.5 mg oral capsule: 1 cap(s) orally every 12 hours

## 2020-03-31 NOTE — PROGRESS NOTE ADULT - PROBLEM SELECTOR PROBLEM 4
Essential hypertension
CRYSTAL (acute kidney injury)
CRYSTAL (acute kidney injury)
Transaminitis

## 2020-03-31 NOTE — PROGRESS NOTE ADULT - PROBLEM SELECTOR PLAN 2
increased Oxy ER to 20 bid with better pain control  continue gabapentin low dose  s/p bowel motion  continue bowel regimen on discharge

## 2020-03-31 NOTE — DISCHARGE NOTE PROVIDER - NSDCCPCAREPLAN_GEN_ALL_CORE_FT
PRINCIPAL DISCHARGE DIAGNOSIS  Diagnosis: UTI (urinary tract infection)  Assessment and Plan of Treatment: resolved      SECONDARY DISCHARGE DIAGNOSES  Diagnosis: Pancreatic cancer metastasized to liver  Assessment and Plan of Treatment: Pancreatic cancer metastasized to liver  s/p liver biopsy  to follow up with GI for biopsy results and further recommendations  pain management for 10 days  to follow up with PMD if he needs more pain medication.

## 2020-03-31 NOTE — PROGRESS NOTE ADULT - REASON FOR ADMISSION
fever, back pain

## 2020-03-31 NOTE — PROGRESS NOTE ADULT - ASSESSMENT
64y/o male w/ pmhx of L kidney transplant 2014, polycystic kidney disease, HTN, hx of spinal stenosis presenting with complaints of acute on chronic upper back pain, fever now for one week, found to have mild transaminitis, sepsis 2/2 uti, and zelda.
65 yr old man with kidney transplant in 2014 on immunosuppression, HTN, recent DVT on Eliquis and chronic back pain due to spinal stenosis.   He presents with severe back pain, weight loss and generalized weakness and found to have liver and pancreatic lesions concerning for metastatic cancer.     1. Kidney transplant in 2014 on immunosuppression with tacrolimus 1mg AM, 2mg PM, Cellcept 500mg po bid       Hold Cellcept in view of suspected malignancy.       Continue tacrolimus 1mg AM, 2mg PM, obtain tacrolimus trough level 30 minutes prior to dose. Maintain level 4-6     2. Severe back pain, weight loss, recent DVT and liver and pancreatic lesion on CT - all concerning for metastatic pancreatic cancer.       Can obtain MRI of the abdomen with contrast. OK with renal function       Pain control - can use Dilaudid PRN, Avoid NSAIDs       Oncology evaluation       IR consult for liver biopsy     3. CRYSTAL likely due to volume depletion/poor po intake +NSAIDs induced CRYSTAL.       BUN/Creatinine improving  with IV fluids. UOP 1.6 liters.       Transplant renal ultrasound shows no hydronephrosis, increased resistance noted at the EIA, but since renal function is improving with fluids no additional studies required at this time.       Avoid NSAIDs, Iodinated contrast agents, ACE/ARB      Encourage po intake , regular diet     4. Fever/leukocytosis, U/A abnormal. Cultures so far negative. Continue Ceftriaxone for now and follow cultures     5. DVT on Eliquis - hold Eliquis start heparin drip in preparation for liver biopsy     6. HTN on Nifedipine at home -  BP fair without any meds. Continue to hold Nifedipine.     Discussed with primary team   Discussed with nephrologist Dr. Yosef Knowles [facility]   Cell # [phone]
65 yr old man with kidney transplant in 2014 on immunosuppression, HTN, recent DVT on Eliquis and chronic back pain due to spinal stenosis.   He presents with severe back pain, weight loss and generalized weakness and found to have liver and pancreatic lesions concerning for metastatic cancer.     1. Kidney transplant in 2014 on immunosuppression with tacrolimus 1mg AM, 2mg PM, Cellcept 500mg po bid       Hold Cellcept in view of suspected malignancy.      Tac level 8.1 . Reduce tacrolimus to 1mg po q12 hours. Obtain tacrolimus trough level 30 minutes prior to AM dose. Maintain level 4-6     2. Severe back pain, weight loss, recent DVT and liver and pancreatic lesion on CT - all concerning for metastatic pancreatic cancer.       Pain control - can use Dilaudid PRN, Avoid NSAIDs       Liver biopsy pending       Oncology evaluation          3. CRYSTAL likely due to volume depletion/poor po intake +NSAIDs induced CRYSTAL.       Improved with IV fluids. Creatinine 1.7mg/dL today better but not back to his baseline.       Transplant renal ultrasound shows no hydronephrosis, increased resistance noted at the EIA, but since renal function is improving with fluids no additional studies required at this time.       Avoid NSAIDs, Iodinated contrast agents, ACE/ARB      Encourage po intake , regular diet     4. Fever/leukocytosis - improving.  U/A abnormal but Cultures so far negative.       Has had 3 days of rx. Can d/c Ceftriaxone     5. DVT on Eliquis at home. Now on Lovenox in preparation for liver biopsy     6. HTN on Nifedipine at home -  BP fair without any meds. Continue to hold Nifedipine.       Eleni Dahl   Cell # [phone]
65 yr old man with kidney transplant in 2014 on immunosuppression, HTN, recent DVT on Eliquis and chronic back pain due to spinal stenosis.   He presents with severe back pain, weight loss and generalized weakness and found to have liver and pancreatic lesions concerning for metastatic cancer.     1. Kidney transplant in 2014- Home immunosuppression with tacrolimus 1mg AM, 2mg PM, Cellcept 500mg po bid       Continue to hold Cellcept in view of suspected malignancy.      Await tacrolimus level before discharging patient - ideal level 4-6.  Pt does not have 0.5mgs pills at home      Will consider changing to mTOR inhibitor based immunosuppression (Sirolimus) after biopsy confirmation. mTOR inhibitors have benefit in reducing malignancy but only proven for non melanoma skin cancers, marginal benefit with solid organ malignancies.     2. CRYSTAL likely due to volume depletion +NSAIDs induced CRYSTAL + elevated tacrolimus trough.       Improved with IV fluids. Creatinine 1.6mg/dL stable but not  but not back to his baseline.       Transplant renal ultrasound shows no hydronephrosis.        Avoid NSAIDs, Iodinated contrast agents, ACE/ARB      Encourage po intake , regular diet     3. Suspected metastatic pancreatic cancer      - Awaiting biopsy results      - Pain better controlled with oxycodone and Dilaudid Avoid NSAIDs       4. DVT on Eliquis at home. Restart anticoagulation per primary team.      5. HTN on Nifedipine at home -  BP fair without any meds.       Sylvester Thornton DO    cell: [phone]  office: [phone]
65 yr old man with kidney transplant in 2014 on immunosuppression, HTN, recent DVT on Eliquis and chronic back pain due to spinal stenosis.   He presents with severe back pain, weight loss and generalized weakness and found to have liver and pancreatic lesions concerning for metastatic cancer.     1. Kidney transplant in 2014- Home immunosuppression with tacrolimus 1mg AM, 2mg PM, Cellcept 500mg po bid       Continue to hold Cellcept in view of suspected malignancy.      Tacrolimus level 11 - please reduce tacrolimus to 0.5mgs twice per day      Will consider changing to mTOR inhibitor based immunosuppression (Sirolimus) after biopsy confirmation. mTOR inhibitors have benefit in reducing malignancy but only proven for non melanoma skin cancers, marginal benefit with solid organ malignancies.     2. CRYSTAL likely due to volume depletion +NSAIDs induced CRYSTAL + elevated tacrolimus trough.       Improved with IV fluids. Creatinine 1.7mg/dL stable but not  but not back to his baseline.       Transplant renal ultrasound shows no hydronephrosis.        Avoid NSAIDs, Iodinated contrast agents, ACE/ARB      Encourage po intake , regular diet     3. Suspected metastatic pancreatic cancer      - Awaiting  Liver biopsy on Monday       - Pain better controlled with oxycodone and Dilaudid Avoid NSAIDs       4. DVT on Eliquis at home. Now on Lovenox in preparation for liver biopsy     5. HTN on Nifedipine at home -  BP fair without any meds.       Sylvester Thornton DO    cell: [phone]  office: [phone]
65 yr old man with kidney transplant in 2014 on immunosuppression, HTN, recent DVT on Eliquis and chronic back pain due to spinal stenosis.   He presents with severe back pain, weight loss and generalized weakness and found to have liver and pancreatic lesions concerning for metastatic cancer.     1. Kidney transplant in 2014- Home immunosuppression with tacrolimus 1mg AM, 2mg PM, Cellcept 500mg po bid       Continue to hold Cellcept in view of suspected malignancy.      Tacrolimus level 13.5 yesterday but not true trough.  Please obtain tacrolimus level 30minutes prior to AM dose. Aim for level of 4-6      Reduce tacrolimus to 1mg po q12 hours.       Will consider changing to mTOR inhibitor based immunosuppression (Sirolimus) after biopsy confirmation. mTOR inhibitors have benefit in reducing malignancy but only proven for non melanoma skin cancers, marginal benefit with solid organ malignancies.     2. CRYSTAL likely due to volume depletion +NSAIDs induced CRYSTAL.       Improved with IV fluids. Creatinine 1.7mg/dL stable but not  but not back to his baseline.       Transplant renal ultrasound shows no hydronephrosis, increased resistance noted at the EIA, but since renal function is improving with fluids no additional studies required at this time.       Avoid NSAIDs, Iodinated contrast agents, ACE/ARB      Encourage po intake , regular diet       3. Suspected metastatic pancreatic cancer      - Awaiting  Liver biopsy on Monday       - Pain better controlled with oxycodone and Dilaudid Avoid NSAIDs       4. DVT on Eliquis at home. Now on Lovenox in preparation for liver biopsy     5. HTN on Nifedipine at home -  BP fair without any meds. Continue to hold Nifedipine.       Eleni Dahl   Cell # [phone]
66y/o male w/ pmhx of L kidney transplant 2014, polycystic kidney disease, HTN, hx of spinal stenosis presenting with complaints of acute on chronic upper back pain, fever now for one week, found to have mild transaminitis, sepsis 2/2 uti, and zelda.

## 2020-03-31 NOTE — PROGRESS NOTE ADULT - PROBLEM SELECTOR PROBLEM 1
Pancreatic cancer metastasized to liver
Pancreatic cancer metastasized to liver
UTI (urinary tract infection)
Pancreatic cancer metastasized to liver

## 2020-03-31 NOTE — PROGRESS NOTE ADULT - PROBLEM SELECTOR PROBLEM 2
Sepsis without acute organ dysfunction, due to unspecified organism
Back pain

## 2020-03-31 NOTE — PROGRESS NOTE ADULT - SUBJECTIVE AND OBJECTIVE BOX
[facility] DIVISION OF KIDNEY DISEASES AND HYPERTENSION -- FOLLOW UP NOTE  --------------------------------------------------------------------------------  Chief Complaint:  Back pain    24 hour events/subjective:  Pt feels ok, s/p biopsy of mass yesterday      PAST HISTORY  --------------------------------------------------------------------------------  No significant changes to PMH, PSH, FHx, SHx, unless otherwise noted    ALLERGIES & MEDICATIONS  --------------------------------------------------------------------------------  Allergies    No Known Allergies    Intolerances      Standing Inpatient Medications  gabapentin 100 milliGRAM(s) Oral every 12 hours  influenza   Vaccine 0.5 milliLiter(s) IntraMuscular once  oxyCODONE  ER Tablet 20 milliGRAM(s) Oral every 12 hours  polyethylene glycol 3350 17 Gram(s) Oral daily  senna 2 Tablet(s) Oral at bedtime  tacrolimus 0.5 milliGRAM(s) Oral every 12 hours    PRN Inpatient Medications  acetaminophen   Tablet .. 650 milliGRAM(s) Oral every 6 hours PRN  bisacodyl Suppository 10 milliGRAM(s) Rectal daily PRN  HYDROmorphone  Injectable 0.5 milliGRAM(s) IV Push every 6 hours PRN  oxyCODONE    IR 10 milliGRAM(s) Oral every 4 hours PRN      REVIEW OF SYSTEMS  --------------------------------------------------------------------------------  Gen: +fatigue  Skin: No rashes  Head/Eyes/Ears/Mouth: No headache;No sore throat  Respiratory: No dyspnea, cough,   CV: No chest pain, PND, orthopnea  GI: No abdominal pain, diarrhea, constipation, nausea, vomiting  Transplant: No pain  : No increased frequency, dysuria, hematuria, nocturia  MSK: back pain  Neuro: No dizziness/lightheadedness, weakness, seizures, numbness, tingling  Psych: No significant nervousness, anxiety, stress, depression    All other systems were reviewed and are negative, except as noted.    VITALS/PHYSICAL EXAM  --------------------------------------------------------------------------------  T(C): 37.2 (03-31-20 @ 04:12), Max: 37.2 (03-31-20 @ 04:12)  HR: 61 (03-31-20 @ 04:12) (54 - 66)  BP: 122/73 (03-31-20 @ 04:12) (122/73 - 152/75)  RR: 18 (03-31-20 @ 04:12) (17 - 18)  SpO2: 94% (03-31-20 @ 04:12) (94% - 95%)  Wt(kg): --        03-30-20 @ 07:01  -  03-31-20 @ 07:00  --------------------------------------------------------  IN: 360 mL / OUT: 500 mL / NET: -140 mL      Physical Exam:  	Gen: NAD  	HEENT: PERRL, supple neck, clear oropharynx  	Pulm: CTA B/L  	CV: RRR, S1S2; no rub  	Back: No spinal or CVA tenderness; no sacral edema  	Abd: +BS, soft, nontender/nondistended                      Transplant: No tenderness, swelling  	: No suprapubic tenderness  	UE: Warm, FROM; no edema; no asterixis  	LE: Warm, FROM; no edema  	Neuro: No focal deficits  	Psych: Normal affect and mood  	Skin: Warm, without rashes      LABS/STUDIES  --------------------------------------------------------------------------------              11.6   9.67  >-----------<  155      [03-31-20 @ 05:56]              37.7     136  |  103  |  33  ----------------------------<  130      [03-31-20 @ 05:53]  5.0   |  24  |  1.62        Ca     8.7     [03-31-20 @ 05:53]    TPro  6.2  /  Alb  2.8  /  TBili  1.6  /  DBili  x   /  AST  77  /  ALT  49  /  AlkPhos  321  [03-31-20 @ 05:53]          Creatinine Trend:  SCr 1.62 [03-31 @ 05:53]  SCr 1.55 [03-30 @ 07:40]  SCr 1.75 [03-28 @ 07:01]  SCr 1.77 [03-27 @ 06:37]  SCr 1.71 [03-26 @ 06:53]    Tacrolimus (), Serum: 11.3 ng/mL (03-29 @ 16:48)  Tacrolimus (), Serum: 11.3 ng/mL (03-28 @ 11:21)  Tacrolimus (), Serum: 13.5 ng/mL (03-27 @ 12:18)  Tacrolimus (), Serum: 8.1 ng/mL (03-26 @ 02:14)            Urinalysis - [03-23-20 @ 23:55]      Color Dark Yellow / Appearance Slightly Turbid / SG 1.031 / pH 5.5      Gluc Negative / Ketone Negative  / Bili Small / Urobili 4 mg/dL       Blood Negative / Protein 30 mg/dL / Leuk Est Moderate / Nitrite Negative      RBC 3 / WBC 12 / Hyaline 7 / Gran  / Sq Epi  / Non Sq Epi 1 / Bacteria Negative        HBsAg Nonreact      [03-25-20 @ 11:54]  HCV 0.10, Nonreact      [03-25-20 @ 11:54]

## 2020-03-31 NOTE — DISCHARGE NOTE PROVIDER - PROVIDER TOKENS
PROVIDER:[TOKEN:[[id number]]],FREE:[LAST:[[facility]],PHONE:[phone]],FAX:[(   [phone]    -],ADDRESS:[[address] [place] [address]]]

## 2020-03-31 NOTE — PROGRESS NOTE ADULT - SUBJECTIVE AND OBJECTIVE BOX
Patient is a 65y old  Male who presents with a chief complaint of fever, back pain (31 Mar 2020 13:47)      SUBJECTIVE / OVERNIGHT EVENTS: overnight events noted    ROS:  Resp: No cough no sputum production  CVS: No chest pain no palpitations no orthopnea  GI: no N/V/D  : no dysuria, no hematuria  Neuro: no weakness no paresthesias  Heme: No petechiae no easy bruising  Msk: No joint pain no swelling  Skin: No rash no itching        MEDICATIONS  (STANDING):  gabapentin 100 milliGRAM(s) Oral every 12 hours  influenza   Vaccine 0.5 milliLiter(s) IntraMuscular once  oxyCODONE  ER Tablet 20 milliGRAM(s) Oral every 12 hours  polyethylene glycol 3350 17 Gram(s) Oral daily  senna 2 Tablet(s) Oral at bedtime  tacrolimus 0.5 milliGRAM(s) Oral every 12 hours    MEDICATIONS  (PRN):  acetaminophen   Tablet .. 650 milliGRAM(s) Oral every 6 hours PRN Temp greater or equal to 38C (100.4F), Mild Pain (1 - 3)  bisacodyl Suppository 10 milliGRAM(s) Rectal daily PRN Constipation  HYDROmorphone  Injectable 0.5 milliGRAM(s) IV Push every 6 hours PRN Severe Pain (7 - 10)  oxyCODONE    IR 10 milliGRAM(s) Oral every 4 hours PRN Moderate Pain (4 - 6)        CAPILLARY BLOOD GLUCOSE        I&O's Summary    30 Mar 2020 07:01  -  31 Mar 2020 07:00  --------------------------------------------------------  IN: 360 mL / OUT: 500 mL / NET: -140 mL    31 Mar 2020 07:01  -  31 Mar 2020 14:11  --------------------------------------------------------  IN: 480 mL / OUT: 300 mL / NET: 180 mL        Vital Signs Last 24 Hrs  T(C): 37.1 (31 Mar 2020 14:10), Max: 37.2 (31 Mar 2020 04:12)  T(F): 98.7 (31 Mar 2020 14:10), Max: 99 (31 Mar 2020 04:12)  HR: 70 (31 Mar 2020 14:10) (61 - 70)  BP: 142/79 (31 Mar 2020 14:10) (122/73 - 142/79)  BP(mean): --  RR: 18 (31 Mar 2020 14:10) (17 - 18)  SpO2: 95% (31 Mar 2020 14:10) (94% - 95%)    PHYSICAL EXAM:  GENERAL: in no apparent distress  HEAD:  Atraumatic, Normocephalic  EYES: EOMI, PERRLA, conjunctiva and sclera clear  NECK: Supple, No JVD  CHEST/LUNG: no wheeze, clear   HEART: S1 S2; no murmurs appreciated  ABDOMEN: Soft, Nontender, Nondistended; Bowel sounds present  EXTREMITIES:  No clubbing or cyanosis, + Peripheral Pulses,  no edema  PSYCH: AO x 3 appropriate affect  NEUROLOGY: non-focal, motor and sensory systems intact  SKIN: No rashes or lesions    LABS:                        11.6   9.67  )-----------( 155      ( 31 Mar 2020 05:56 )             37.7     03-31    136  |  103  |  33<H>  ----------------------------<  130<H>  5.0   |  24  |  1.62<H>    Ca    8.7      31 Mar 2020 05:53    TPro  6.2  /  Alb  2.8<L>  /  TBili  1.6<H>  /  DBili  x   /  AST  77<H>  /  ALT  49<H>  /  AlkPhos  321<H>  03-31                All consultant(s) notes reviewed and care discussed with other providers        Contact Number, Dr Johnson [phone]

## 2020-04-03 DIAGNOSIS — Z87.39 PERSONAL HISTORY OF OTHER DISEASES OF THE MUSCULOSKELETAL SYSTEM AND CONNECTIVE TISSUE: ICD-10-CM

## 2020-04-03 DIAGNOSIS — D17.1 BENIGN LIPOMATOUS NEOPLASM OF SKIN AND SUBCUTANEOUS TISSUE OF TRUNK: ICD-10-CM

## 2020-04-03 DIAGNOSIS — Q61.2 POLYCYSTIC KIDNEY, ADULT TYPE: ICD-10-CM

## 2020-04-03 DIAGNOSIS — I10 ESSENTIAL (PRIMARY) HYPERTENSION: ICD-10-CM

## 2020-04-03 DIAGNOSIS — N18.4 CHRONIC KIDNEY DISEASE, STAGE 4 (SEVERE): ICD-10-CM

## 2020-04-06 PROBLEM — D17.1 LIPOMA OF TORSO: Status: ACTIVE | Noted: 2019-10-31

## 2020-04-06 PROBLEM — C25.9 ADENOCARCINOMA OF PANCREAS: Status: ACTIVE | Noted: 2020-04-06

## 2020-04-06 RX ORDER — OXYCODONE HYDROCHLORIDE 20 MG/1
20 TABLET, EXTENDED RELEASE ORAL
Refills: 0 | Status: ACTIVE | COMMUNITY
Start: 2020-04-06

## 2020-04-06 RX ORDER — OXYCODONE 10 MG/1
10 TABLET ORAL
Qty: 30 | Refills: 0 | Status: ACTIVE | COMMUNITY
Start: 2020-04-06

## 2020-04-07 ENCOUNTER — TRANSCRIPTION ENCOUNTER (OUTPATIENT)
Age: 65
End: 2020-04-07

## 2020-04-07 ENCOUNTER — OUTPATIENT (OUTPATIENT)
Dept: OUTPATIENT SERVICES | Facility: HOSPITAL | Age: 65
LOS: 1 days | Discharge: HOME | End: 2020-04-07

## 2020-04-07 ENCOUNTER — APPOINTMENT (OUTPATIENT)
Dept: HEMATOLOGY ONCOLOGY | Facility: CLINIC | Age: 65
End: 2020-04-07
Payer: MEDICARE

## 2020-04-07 DIAGNOSIS — Z60.2 PROBLEMS RELATED TO LIVING ALONE: ICD-10-CM

## 2020-04-07 DIAGNOSIS — C25.9 MALIGNANT NEOPLASM OF PANCREAS, UNSPECIFIED: ICD-10-CM

## 2020-04-07 DIAGNOSIS — R16.0 HEPATOMEGALY, NOT ELSEWHERE CLASSIFIED: ICD-10-CM

## 2020-04-07 DIAGNOSIS — Z98.890 OTHER SPECIFIED POSTPROCEDURAL STATES: Chronic | ICD-10-CM

## 2020-04-07 DIAGNOSIS — Z63.4 DISAPPEARANCE AND DEATH OF FAMILY MEMBER: ICD-10-CM

## 2020-04-07 DIAGNOSIS — Z94.0 KIDNEY TRANSPLANT STATUS: Chronic | ICD-10-CM

## 2020-04-07 PROCEDURE — 99213 OFFICE O/P EST LOW 20 MIN: CPT | Mod: 95

## 2020-04-07 SDOH — SOCIAL STABILITY - SOCIAL INSECURITY: PROBLEMS RELATED TO LIVING ALONE: Z60.2

## 2020-04-07 SDOH — SOCIAL STABILITY - SOCIAL INSECURITY: DISSAPEARANCE AND DEATH OF FAMILY MEMBER: Z63.4

## 2020-04-08 PROBLEM — Z63.4 WIDOWER: Status: ACTIVE | Noted: 2020-04-08

## 2020-04-08 PROBLEM — Z60.2 LIVES ALONE WITH HELP AVAILABLE: Status: ACTIVE | Noted: 2020-04-08

## 2020-04-08 NOTE — ASSESSMENT
[FreeTextEntry1] : Pancreatic Cancer Stage 4 with liver mets: \par Given the current situation that he sustained a stroke and poor ECOG status and overall physical condition- he is not a candidate for chemotherapy. After discussion with the daughter who is the health care proxy would pursue comfort care - Will refer patient to Hospice services. \par Contacted  to ensure patient will receive appropriate hospice care.\par Discussed the consult and findings with his nephrologist Dr. José Navas.\par

## 2020-04-08 NOTE — HISTORY OF PRESENT ILLNESS
[Other:____] : [unfilled] [Mother] : mother [FreeTextEntry2] : Lynne  [FreeTextEntry3] : daughter  [de-identified] : Diagnosis: Pancreatic adenocarcinoma\par \par Genetic Testing:  [employer] one testing sent on CDX\par \par Other Current Medical Problems:L kidney transplant 2014, polycystic kidney disease, HTN, hx of spinal stenosis. Lipoma , DVT. high cholestrol.\par \par \par Oncological History :\par 64y/o male with c/o  back pain for the last 5 months, which has become progressively worse this past week, and with associated fevers of 101 at home.\par  He states he has some intermittent SOB at times and also notes loss of appetite, weight loss of 30 pounds in the last 2.5 months. He was hospitalized at [facility] from 03/24/20 to 03/31/20.Prior to this episode he had  Swelling and pain on right leg -US- legs showed  Blood clot right leg- on  March 8 th \par Since his liver function were elevated- his nephrologist had ordered for CT  abdomen/ pelvis-  \par COVID was negative. \par On 03/24/20- Ct scan abdomen Innumerable new hepatic lesions, indeterminate without contrast, but metastatic disease is a consideration.\par CT chest- Bibasilar subsegmental atelectasis. Otherwise, the lungs are clear.\par 03/25/20- MRI abd- Infiltrative pancreatic body mass with extensive bilobar hepatic neoplasm concerning for metastatic pancreatic cancer.\par  03/30/20- LIVER, US GUIDED CORE BIOPSY POSITIVE FOR MALIGNANT CELLS. Metastatic adenocarcinoma.\par Currently, he was discharged from the hospital and is home with his mother . However he is not very responsive per daughter and unable to get OOB and had a telehealth visit with an Oncologist at [facility]. Per daughter , his recommendations was that he has stage 4  cancer and is very acute and will not qualify for any chemo and best plan of action would be Palliative care- Hospice care. \par Severe pain- aggressive cancer - palliative care \par \par \par \par \par PMD: Grisel Hollis.\par         Annie Valle (new)\par Nephrologist: Dr José Navas \par \par FHX: No history of Cancer.\par \par Social HX: Social alcohol, never smoked/ no illegal drugs.\par                 / lives with his mother \par              2 Siblings- \par             Worked dept of sanitation.\par \par HCP : pts brother- Jose Francisco Bull - [phone].\par \par       \par \par PSX: Kidney transplant 2014 \par         Lipoma resection -dec 2019\par \par \par \par Disease:\par Pathology:\par \par AJCC Stage : Stage 4 pancreatic cancer with liver mets\par \par Tumor Markers:\par  [de-identified] : 04/07/20- Today we conducted a  3 way tele visit with the patient in his mothers home, daughter Arnel who is in [place], and me and Dr rodrigues. \par He was in bed likely sustained a stroke overnight- he is unable to move his right hand , has a facial droop on the right side with drooling noted. Per daughter arnel, he has been  very lethargic and in severe pain - since Friday. He had a tele visit with oncologist from [facility]- He was advised that he is not a candidate for chemotherapy and he may benefit with Hospice services. Hospice visit was scheduled for this afternoon. \par From, what we see via tele health he will need a hospital bed and comfort care. Given that he likely sustained a stroke he may benefit with avoiding any PO meds/ feed- Advised he may benefit with Fentanyl patch and Robinul or Actiq lollipop .Hospice may be appropriate given that he had a DVT recently and now likely suspect a stroke.

## 2020-06-01 ENCOUNTER — APPOINTMENT (OUTPATIENT)
Dept: HEMATOLOGY ONCOLOGY | Facility: CLINIC | Age: 65
End: 2020-06-01

## 2021-09-30 NOTE — ED ADULT NURSE NOTE - NSFALLRSKHARMRISK_ED_ALL_ED
yes Fusiform Excision Additional Text (Leave Blank If You Do Not Want): The margin was drawn around the clinically apparent lesion.  A fusiform shape was then drawn on the skin incorporating the lesion and margins.  Incisions were then made along these lines to the appropriate tissue plane and the lesion was extirpated.

## 2021-10-02 NOTE — ASU PREOP CHECKLIST - HAIR REMOVAL
Patient A&0x4. Up ad rosendo. Ambulating in his room. Iso for VRE.  Tolerating full liquid diet. Dressing on incision is CDI. Dressing on left flank is CDI. Rash noted on back - MD aware. Benadryl available if requested. Lungs clear. No edema. VSS.  Denies pain.  Midline IV placed . D5 normal running at 100ml/hr. Full code.    hair removal not indicated

## 2023-01-22 NOTE — PHYSICAL EXAM
[No Acute Distress] : no acute distress [Well Nourished] : well nourished [Well Developed] : well developed [Well-Appearing] : well-appearing [Normal Sclera/Conjunctiva] : normal sclera/conjunctiva [Normal Outer Ear/Nose] : the outer ears and nose were normal in appearance [Normal Oropharynx] : the oropharynx was normal [Supple] : supple [No Respiratory Distress] : no respiratory distress  [No Accessory Muscle Use] : no accessory muscle use [Clear to Auscultation] : lungs were clear to auscultation bilaterally [Normal Rate] : normal rate  [Regular Rhythm] : with a regular rhythm [Normal S1, S2] : normal S1 and S2 [No Edema] : there was no peripheral edema [Soft] : abdomen soft [Non Tender] : non-tender [Non-distended] : non-distended [Normal Bowel Sounds] : normal bowel sounds [No CVA Tenderness] : no CVA  tenderness [Grossly Normal Strength/Tone] : grossly normal strength/tone [Coordination Grossly Intact] : coordination grossly intact [No Focal Deficits] : no focal deficits [Normal Gait] : normal gait [Normal Affect] : the affect was normal [Alert and Oriented x3] : oriented to person, place, and time [Normal Insight/Judgement] : insight and judgment were intact [de-identified] : multiple lipomas felt along left flank, left lateral thoracic, right abdomen Yes

## 2023-08-18 NOTE — PROGRESS NOTE ADULT - PROBLEM SELECTOR PLAN 1
Problem: Discharge Planning  Goal: Discharge to home or other facility with appropriate resources  8/18/2023 1630 by Paresh Winston RN  Outcome: Completed  8/18/2023 1001 by Paresh Winston RN  Outcome: Progressing  Flowsheets (Taken 8/18/2023 0857)  Discharge to home or other facility with appropriate resources: Identify barriers to discharge with patient and caregiver     Problem: Pain  Goal: Verbalizes/displays adequate comfort level or baseline comfort level  8/18/2023 1630 by Paresh Winston RN  Outcome: Completed  8/18/2023 1001 by Paresh iWnston RN  Outcome: Progressing     Problem: Safety - Adult  Goal: Free from fall injury  8/18/2023 1630 by Paresh Winston RN  Outcome: Completed  8/18/2023 1001 by Paresh Winston RN  Outcome: Progressing     Problem: ABCDS Injury Assessment  Goal: Absence of physical injury  8/18/2023 1630 by Paresh Winston RN  Outcome: Completed  8/18/2023 1001 by Paresh Winston RN  Outcome: Progressing     Problem: Chronic Conditions and Co-morbidities  Goal: Patient's chronic conditions and co-morbidity symptoms are monitored and maintained or improved  8/18/2023 1630 by Paresh Winston RN  Outcome: Completed  8/18/2023 1001 by Paresh Winston RN  Outcome: Progressing  Flowsheets (Taken 8/18/2023 0857)  Care Plan - Patient's Chronic Conditions and Co-Morbidity Symptoms are Monitored and Maintained or Improved: Monitor and assess patient's chronic conditions and comorbid symptoms for stability, deterioration, or improvement     Problem: Nutrition Deficit:  Goal: Optimize nutritional status  8/18/2023 1630 by Paresh Winston RN  Outcome: Completed  8/18/2023 1001 by Paresh Winston RN  Outcome: Progressing blood cultures and urine culture negative  will continue ceftriaxone IV for now however as he is immune suppressed   CT chest non-con to r/o pneumonia

## 2023-12-01 NOTE — PATIENT PROFILE ADULT - FUNCTIONAL SCREEN CURRENT LEVEL: EATING, MLM
Addended by: JORDON DEL ANGEL on: 12/1/2023 02:52 PM     Modules accepted: Orders     0 = independent
